# Patient Record
Sex: FEMALE | Race: WHITE | NOT HISPANIC OR LATINO | Employment: OTHER | URBAN - METROPOLITAN AREA
[De-identification: names, ages, dates, MRNs, and addresses within clinical notes are randomized per-mention and may not be internally consistent; named-entity substitution may affect disease eponyms.]

---

## 2023-03-06 PROBLEM — M81.0 OSTEOPOROSIS: Status: ACTIVE | Noted: 2023-03-06

## 2023-03-06 PROBLEM — C50.419 CANCER OF UPPER-OUTER QUADRANT OF FEMALE BREAST (MULTI): Status: ACTIVE | Noted: 2023-03-06

## 2023-03-06 PROBLEM — R30.0 BURNING WITH URINATION: Status: ACTIVE | Noted: 2023-03-06

## 2023-03-06 PROBLEM — I10 BENIGN ESSENTIAL HYPERTENSION: Status: ACTIVE | Noted: 2023-03-06

## 2023-03-06 PROBLEM — S93.409A ANKLE SPRAIN: Status: ACTIVE | Noted: 2023-03-06

## 2023-03-06 PROBLEM — E55.9 VITAMIN D DEFICIENCY: Status: ACTIVE | Noted: 2023-03-06

## 2023-03-06 PROBLEM — S82.899A ANKLE FRACTURE: Status: ACTIVE | Noted: 2023-03-06

## 2023-03-06 PROBLEM — E78.00 PURE HYPERCHOLESTEROLEMIA: Status: ACTIVE | Noted: 2023-03-06

## 2023-03-06 PROBLEM — J18.9 PNEUMONIA: Status: ACTIVE | Noted: 2023-03-06

## 2023-03-06 PROBLEM — R92.30 DENSE BREAST TISSUE ON MAMMOGRAM: Status: ACTIVE | Noted: 2023-03-06

## 2023-03-06 PROBLEM — R05.9 COUGH: Status: ACTIVE | Noted: 2023-03-06

## 2023-03-06 PROBLEM — R30.9 URINARY PAIN: Status: ACTIVE | Noted: 2023-03-06

## 2023-03-06 PROBLEM — J84.89 BOOP (BRONCHIOLITIS OBLITERANS WITH ORGANIZING PNEUMONIA) (MULTI): Status: ACTIVE | Noted: 2023-03-06

## 2023-03-06 RX ORDER — TRIAMTERENE AND HYDROCHLOROTHIAZIDE 37.5; 25 MG/1; MG/1
1 CAPSULE ORAL DAILY
COMMUNITY
Start: 2016-12-02 | End: 2023-10-16 | Stop reason: SDUPTHER

## 2023-03-06 RX ORDER — CHOLECALCIFEROL (VITAMIN D3) 125 MCG
50 TABLET ORAL DAILY
COMMUNITY
Start: 2016-07-07

## 2023-03-06 RX ORDER — CALCIUM CARBONATE 200(500)MG
2 TABLET,CHEWABLE ORAL DAILY
COMMUNITY
Start: 2016-07-07

## 2023-03-06 RX ORDER — MULTIVIT-MIN/IRON/FOLIC ACID/K 18-600-40
CAPSULE ORAL
COMMUNITY
Start: 2020-01-14

## 2023-03-06 RX ORDER — ATORVASTATIN CALCIUM 20 MG/1
1 TABLET, FILM COATED ORAL DAILY
COMMUNITY
Start: 2015-11-19 | End: 2023-10-16 | Stop reason: SDUPTHER

## 2023-03-06 RX ORDER — BIOTIN 1 MG
TABLET ORAL
COMMUNITY
Start: 2021-01-19

## 2023-03-06 RX ORDER — LEVOTHYROXINE SODIUM 75 UG/1
1 TABLET ORAL DAILY
COMMUNITY
Start: 2020-11-11 | End: 2023-10-16 | Stop reason: SDUPTHER

## 2023-03-06 RX ORDER — LATANOPROST 50 UG/ML
SOLUTION/ DROPS OPHTHALMIC
COMMUNITY
Start: 2021-01-19

## 2023-03-10 RX ORDER — ATORVASTATIN CALCIUM 10 MG/1
TABLET, FILM COATED ORAL
COMMUNITY
End: 2023-10-23 | Stop reason: SDUPTHER

## 2023-03-10 RX ORDER — PREDNISONE 10 MG/1
TABLET ORAL
COMMUNITY
Start: 2019-08-13 | End: 2024-03-26 | Stop reason: WASHOUT

## 2023-03-13 LAB
ALANINE AMINOTRANSFERASE (SGPT) (U/L) IN SER/PLAS: 10 U/L (ref 7–45)
ANION GAP IN SER/PLAS: 17 MMOL/L (ref 10–20)
APPEARANCE, URINE: CLEAR
BASOPHILS (10*3/UL) IN BLOOD BY AUTOMATED COUNT: 0.03 X10E9/L (ref 0–0.1)
BASOPHILS/100 LEUKOCYTES IN BLOOD BY AUTOMATED COUNT: 0.4 % (ref 0–2)
BILIRUBIN, URINE: NEGATIVE
BLOOD, URINE: NEGATIVE
CALCIDIOL (25 OH VITAMIN D3) (NG/ML) IN SER/PLAS: 29 NG/ML
CALCIUM (MG/DL) IN SER/PLAS: 9.8 MG/DL (ref 8.6–10.6)
CARBON DIOXIDE, TOTAL (MMOL/L) IN SER/PLAS: 27 MMOL/L (ref 21–32)
CHLORIDE (MMOL/L) IN SER/PLAS: 101 MMOL/L (ref 98–107)
CHOLESTEROL (MG/DL) IN SER/PLAS: 192 MG/DL (ref 0–199)
CHOLESTEROL IN HDL (MG/DL) IN SER/PLAS: 75.2 MG/DL
CHOLESTEROL/HDL RATIO: 2.6
COBALAMIN (VITAMIN B12) (PG/ML) IN SER/PLAS: 595 PG/ML (ref 211–911)
COLOR, URINE: YELLOW
CREATININE (MG/DL) IN SER/PLAS: 0.9 MG/DL (ref 0.5–1.05)
EOSINOPHILS (10*3/UL) IN BLOOD BY AUTOMATED COUNT: 0.15 X10E9/L (ref 0–0.4)
EOSINOPHILS/100 LEUKOCYTES IN BLOOD BY AUTOMATED COUNT: 1.9 % (ref 0–6)
ERYTHROCYTE DISTRIBUTION WIDTH (RATIO) BY AUTOMATED COUNT: 13.4 % (ref 11.5–14.5)
ERYTHROCYTE MEAN CORPUSCULAR HEMOGLOBIN CONCENTRATION (G/DL) BY AUTOMATED: 31.7 G/DL (ref 32–36)
ERYTHROCYTE MEAN CORPUSCULAR VOLUME (FL) BY AUTOMATED COUNT: 85 FL (ref 80–100)
ERYTHROCYTES (10*6/UL) IN BLOOD BY AUTOMATED COUNT: 5.09 X10E12/L (ref 4–5.2)
GFR FEMALE: 66 ML/MIN/1.73M2
GLUCOSE (MG/DL) IN SER/PLAS: 114 MG/DL (ref 74–99)
GLUCOSE, URINE: NEGATIVE MG/DL
HEMATOCRIT (%) IN BLOOD BY AUTOMATED COUNT: 43.5 % (ref 36–46)
HEMOGLOBIN (G/DL) IN BLOOD: 13.8 G/DL (ref 12–16)
IMMATURE GRANULOCYTES/100 LEUKOCYTES IN BLOOD BY AUTOMATED COUNT: 0.3 % (ref 0–0.9)
KETONES, URINE: NEGATIVE MG/DL
LDL: 98 MG/DL (ref 0–99)
LEUKOCYTE ESTERASE, URINE: NEGATIVE
LEUKOCYTES (10*3/UL) IN BLOOD BY AUTOMATED COUNT: 7.8 X10E9/L (ref 4.4–11.3)
LYMPHOCYTES (10*3/UL) IN BLOOD BY AUTOMATED COUNT: 3.29 X10E9/L (ref 0.8–3)
LYMPHOCYTES/100 LEUKOCYTES IN BLOOD BY AUTOMATED COUNT: 42.1 % (ref 13–44)
MONOCYTES (10*3/UL) IN BLOOD BY AUTOMATED COUNT: 0.62 X10E9/L (ref 0.05–0.8)
MONOCYTES/100 LEUKOCYTES IN BLOOD BY AUTOMATED COUNT: 7.9 % (ref 2–10)
NEUTROPHILS (10*3/UL) IN BLOOD BY AUTOMATED COUNT: 3.7 X10E9/L (ref 1.6–5.5)
NEUTROPHILS/100 LEUKOCYTES IN BLOOD BY AUTOMATED COUNT: 47.4 % (ref 40–80)
NITRITE, URINE: NEGATIVE
NRBC (PER 100 WBCS) BY AUTOMATED COUNT: 0 /100 WBC (ref 0–0)
PH, URINE: 7 (ref 5–8)
PLATELETS (10*3/UL) IN BLOOD AUTOMATED COUNT: 253 X10E9/L (ref 150–450)
POTASSIUM (MMOL/L) IN SER/PLAS: 3.7 MMOL/L (ref 3.5–5.3)
PROTEIN, URINE: NEGATIVE MG/DL
SODIUM (MMOL/L) IN SER/PLAS: 141 MMOL/L (ref 136–145)
SPECIFIC GRAVITY, URINE: 1.01 (ref 1–1.03)
THYROTROPIN (MIU/L) IN SER/PLAS BY DETECTION LIMIT <= 0.05 MIU/L: 2.47 MIU/L (ref 0.44–3.98)
TRIGLYCERIDE (MG/DL) IN SER/PLAS: 96 MG/DL (ref 0–149)
UREA NITROGEN (MG/DL) IN SER/PLAS: 19 MG/DL (ref 6–23)
UROBILINOGEN, URINE: <2 MG/DL (ref 0–1.9)
VLDL: 19 MG/DL (ref 0–40)

## 2023-03-16 ENCOUNTER — OFFICE VISIT (OUTPATIENT)
Dept: PRIMARY CARE | Facility: CLINIC | Age: 78
End: 2023-03-16
Payer: MEDICARE

## 2023-03-16 VITALS
BODY MASS INDEX: 24.17 KG/M2 | WEIGHT: 152 LBS | SYSTOLIC BLOOD PRESSURE: 130 MMHG | HEART RATE: 77 BPM | OXYGEN SATURATION: 98 % | DIASTOLIC BLOOD PRESSURE: 70 MMHG | RESPIRATION RATE: 18 BRPM | TEMPERATURE: 97.3 F

## 2023-03-16 DIAGNOSIS — Z00.00 WELL ADULT EXAM: Primary | ICD-10-CM

## 2023-03-16 DIAGNOSIS — R21 RASH: Primary | ICD-10-CM

## 2023-03-16 PROCEDURE — 3075F SYST BP GE 130 - 139MM HG: CPT | Performed by: INTERNAL MEDICINE

## 2023-03-16 PROCEDURE — 1170F FXNL STATUS ASSESSED: CPT | Performed by: INTERNAL MEDICINE

## 2023-03-16 PROCEDURE — 3078F DIAST BP <80 MM HG: CPT | Performed by: INTERNAL MEDICINE

## 2023-03-16 PROCEDURE — G0439 PPPS, SUBSEQ VISIT: HCPCS | Performed by: INTERNAL MEDICINE

## 2023-03-16 PROCEDURE — 1159F MED LIST DOCD IN RCRD: CPT | Performed by: INTERNAL MEDICINE

## 2023-03-16 RX ORDER — FLUTICASONE PROPIONATE 0.5 MG/G
CREAM TOPICAL 2 TIMES DAILY
Qty: 30 G | Refills: 1 | Status: SHIPPED | OUTPATIENT
Start: 2023-03-16

## 2023-03-16 ASSESSMENT — ACTIVITIES OF DAILY LIVING (ADL)
TOILETING: INDEPENDENT
WALKS IN HOME: INDEPENDENT
BATHING: INDEPENDENT
JUDGMENT_ADEQUATE_SAFELY_COMPLETE_DAILY_ACTIVITIES: YES
GROOMING: INDEPENDENT
PATIENT'S MEMORY ADEQUATE TO SAFELY COMPLETE DAILY ACTIVITIES?: YES
DRESSING YOURSELF: INDEPENDENT
HEARING - RIGHT EAR: FUNCTIONAL
FEEDING YOURSELF: INDEPENDENT
ADEQUATE_TO_COMPLETE_ADL: YES
HEARING - LEFT EAR: FUNCTIONAL

## 2023-03-16 ASSESSMENT — ENCOUNTER SYMPTOMS
LOSS OF SENSATION IN FEET: 0
OCCASIONAL FEELINGS OF UNSTEADINESS: 0
DEPRESSION: 0

## 2023-03-16 NOTE — PROGRESS NOTES
Subjective   Reason for Visit: Bell Ruano is an 77 y.o. female here for a Medicare Wellness visit.          Reviewed all medications by prescribing practitioner or clinical pharmacist (such as prescriptions, OTCs, herbal therapies and supplements) and documented in the medical record.    Patient comes in for a physical examination, doing well over - all with no particular complaints.   Also in for laboratory review and health maintenance update.  Updating family history as well.         Patient Care Team:  Santiago Hutchinson MD as PCP - General  Santiago Hutchinson MD as PCP - [facility] Attributed Provider     Review of Systems    Objective   Vitals:  /70 (BP Location: Left arm, Patient Position: Sitting)   Pulse 77   Temp 36.3 °C (97.3 °F)   Resp 18   Wt 68.9 kg (152 lb)   SpO2 98%   BMI 24.17 kg/m²       Physical Exam  Constitutional:       General: She is not in acute distress.     Appearance: Normal appearance. She is normal weight. She is not ill-appearing, toxic-appearing or diaphoretic.   HENT:      Head: Normocephalic.      Right Ear: Tympanic membrane, ear canal and external ear normal.      Left Ear: Tympanic membrane, ear canal and external ear normal.      Nose: Nose normal.      Mouth/Throat:      Mouth: Mucous membranes are moist.      Pharynx: Oropharynx is clear.   Eyes:      Extraocular Movements: Extraocular movements intact.      Conjunctiva/sclera: Conjunctivae normal.      Pupils: Pupils are equal, round, and reactive to light.   Cardiovascular:      Rate and Rhythm: Normal rate.      Heart sounds: No murmur heard.     No friction rub. No gallop.   Pulmonary:      Effort: Pulmonary effort is normal. No respiratory distress.      Breath sounds: Normal breath sounds. No wheezing or rales.   Abdominal:      General: Abdomen is flat. Bowel sounds are normal. There is no distension.      Palpations: Abdomen is soft. There is no mass.      Tenderness: There is no abdominal tenderness.  There is no guarding or rebound.      Hernia: No hernia is present.   Musculoskeletal:         General: No swelling, tenderness, deformity or signs of injury.      Cervical back: Normal range of motion.      Right lower leg: No edema.      Left lower leg: No edema.   Skin:     General: Skin is warm and dry.   Neurological:      General: No focal deficit present.      Mental Status: She is alert. Mental status is at baseline. She is disoriented.   Psychiatric:         Mood and Affect: Mood normal.         Behavior: Behavior normal.         Assessment/Plan   Problem List Items Addressed This Visit          Other    Well adult exam - Primary   ASSESSMENT AND PLAN: Patient on examination is in good health, will do screening blood tests to screen for high cholesterol, diabetes, thyroid. Patient should be taking enough calcium in a balanced diet or supplements to total 1200 mg a day in divided doses unless with history of specific types of kidney stones. Vitamin D 800-1000 iu a day, check levels if not taking any supplements. For Male Patients Only: Prostate cancer screening PSA. Preventive Medicine: colon cancer screening by age 50 if no family history, balanced diet, and exercise as discussed. Seat belt use for injury prevention, living will. Substance use and /or tobacco use counseled when applicable. Alcohol use discussed, use designated . Immunizations TD age 50 and every 10 years. Pneumovax and shingles vaccine counseled. Yearly flu vaccine unless contraindicated. More than 50% of office visit time spent counseling the patient, questions were answered. If problems arise, patient is to call or come back in. It is understood that the responsibility of healthcare is shared by the patient by following a healthy lifestyle and following the plan above as discussed. Complete physical examination in a year.Patient  knows to call for lab results in two weeks if he does not receive letter or call from our office.

## 2023-10-16 DIAGNOSIS — E03.9 ACQUIRED HYPOTHYROIDISM: ICD-10-CM

## 2023-10-16 DIAGNOSIS — I10 HYPERTENSION, UNSPECIFIED TYPE: Primary | ICD-10-CM

## 2023-10-16 DIAGNOSIS — E78.2 MIXED HYPERLIPIDEMIA: ICD-10-CM

## 2023-10-16 RX ORDER — TRIAMTERENE AND HYDROCHLOROTHIAZIDE 37.5; 25 MG/1; MG/1
1 CAPSULE ORAL DAILY
Qty: 90 CAPSULE | Refills: 1 | Status: SHIPPED | OUTPATIENT
Start: 2023-10-16 | End: 2023-10-23 | Stop reason: SDUPTHER

## 2023-10-16 RX ORDER — ATORVASTATIN CALCIUM 20 MG/1
20 TABLET, FILM COATED ORAL DAILY
Qty: 90 TABLET | Refills: 1 | Status: SHIPPED | OUTPATIENT
Start: 2023-10-16

## 2023-10-16 RX ORDER — LEVOTHYROXINE SODIUM 75 UG/1
75 TABLET ORAL DAILY
Qty: 90 TABLET | Refills: 1 | Status: SHIPPED | OUTPATIENT
Start: 2023-10-16 | End: 2023-10-23 | Stop reason: SDUPTHER

## 2023-10-19 NOTE — TELEPHONE ENCOUNTER
Rxs sent to [facility].  Pt requested send to [facility] in [place] [phone].  Thanks

## 2023-10-23 DIAGNOSIS — I10 HYPERTENSION, UNSPECIFIED TYPE: ICD-10-CM

## 2023-10-23 DIAGNOSIS — E03.9 ACQUIRED HYPOTHYROIDISM: ICD-10-CM

## 2023-10-23 DIAGNOSIS — E78.2 MIXED HYPERLIPIDEMIA: Primary | ICD-10-CM

## 2023-10-23 NOTE — TELEPHONE ENCOUNTER
Patients previous Rx's were sent to wrong pharmacy , prefers [facility] and patient out of medications as of today

## 2023-10-24 RX ORDER — LEVOTHYROXINE SODIUM 75 UG/1
75 TABLET ORAL DAILY
Qty: 90 TABLET | Refills: 1 | Status: SHIPPED | OUTPATIENT
Start: 2023-10-24 | End: 2024-04-22

## 2023-10-24 RX ORDER — TRIAMTERENE AND HYDROCHLOROTHIAZIDE 37.5; 25 MG/1; MG/1
1 CAPSULE ORAL DAILY
Qty: 90 CAPSULE | Refills: 1 | Status: SHIPPED | OUTPATIENT
Start: 2023-10-24 | End: 2024-05-07 | Stop reason: SDUPTHER

## 2023-10-24 RX ORDER — ATORVASTATIN CALCIUM 10 MG/1
10 TABLET, FILM COATED ORAL DAILY
Qty: 90 TABLET | Refills: 1 | Status: SHIPPED | OUTPATIENT
Start: 2023-10-24

## 2024-01-30 ENCOUNTER — HOSPITAL ENCOUNTER (OUTPATIENT)
Dept: RADIOLOGY | Facility: CLINIC | Age: 79
Discharge: HOME | End: 2024-01-30
Payer: MEDICARE

## 2024-01-30 VITALS — BODY MASS INDEX: 24.41 KG/M2 | WEIGHT: 151.9 LBS | HEIGHT: 66 IN

## 2024-01-30 DIAGNOSIS — Z12.39 ENCOUNTER FOR OTHER SCREENING FOR MALIGNANT NEOPLASM OF BREAST: ICD-10-CM

## 2024-01-30 PROCEDURE — 77067 SCR MAMMO BI INCL CAD: CPT | Mod: BILATERAL PROCEDURE | Performed by: RADIOLOGY

## 2024-01-30 PROCEDURE — 77067 SCR MAMMO BI INCL CAD: CPT

## 2024-01-30 PROCEDURE — 77063 BREAST TOMOSYNTHESIS BI: CPT | Mod: BILATERAL PROCEDURE | Performed by: RADIOLOGY

## 2024-02-15 ENCOUNTER — APPOINTMENT (OUTPATIENT)
Dept: RADIOLOGY | Facility: CLINIC | Age: 79
End: 2024-02-15
Payer: MEDICARE

## 2024-02-15 DIAGNOSIS — Z12.31 SCREENING MAMMOGRAM FOR BREAST CANCER: ICD-10-CM

## 2024-02-21 ENCOUNTER — HOSPITAL ENCOUNTER (OUTPATIENT)
Dept: RADIOLOGY | Facility: EXTERNAL LOCATION | Age: 79
Discharge: HOME | End: 2024-02-21
Payer: MEDICARE

## 2024-02-21 DIAGNOSIS — M79.641 RIGHT HAND PAIN: ICD-10-CM

## 2024-03-07 ENCOUNTER — TELEPHONE (OUTPATIENT)
Dept: PRIMARY CARE | Facility: CLINIC | Age: 79
End: 2024-03-07
Payer: MEDICARE

## 2024-03-07 DIAGNOSIS — R79.89 ABNORMAL CBC: ICD-10-CM

## 2024-03-07 DIAGNOSIS — Z00.00 ENCOUNTER FOR ANNUAL WELLNESS EXAM IN MEDICARE PATIENT: ICD-10-CM

## 2024-03-07 DIAGNOSIS — E55.9 VITAMIN D DEFICIENCY: ICD-10-CM

## 2024-03-08 NOTE — TELEPHONE ENCOUNTER
Spoke with [facility] on a good wrist/hand ortho provider and he recc. Dr. Darvin Cuba - he works at [facility] ,office number is [phone] . LVM for patient explaining, and to call back with any further questions

## 2024-03-12 ENCOUNTER — OFFICE VISIT (OUTPATIENT)
Dept: ORTHOPEDIC SURGERY | Facility: CLINIC | Age: 79
End: 2024-03-12
Payer: MEDICARE

## 2024-03-12 DIAGNOSIS — S69.91XA RIGHT WRIST INJURY, INITIAL ENCOUNTER: ICD-10-CM

## 2024-03-12 PROCEDURE — 1160F RVW MEDS BY RX/DR IN RCRD: CPT | Performed by: ORTHOPAEDIC SURGERY

## 2024-03-12 PROCEDURE — 1159F MED LIST DOCD IN RCRD: CPT | Performed by: ORTHOPAEDIC SURGERY

## 2024-03-12 PROCEDURE — 1125F AMNT PAIN NOTED PAIN PRSNT: CPT | Performed by: ORTHOPAEDIC SURGERY

## 2024-03-12 PROCEDURE — 99204 OFFICE O/P NEW MOD 45 MIN: CPT | Performed by: ORTHOPAEDIC SURGERY

## 2024-03-12 PROCEDURE — L3908 WHO COCK-UP NONMOLDE PRE OTS: HCPCS | Performed by: ORTHOPAEDIC SURGERY

## 2024-03-12 PROCEDURE — 1036F TOBACCO NON-USER: CPT | Performed by: ORTHOPAEDIC SURGERY

## 2024-03-12 ASSESSMENT — PAIN - FUNCTIONAL ASSESSMENT: PAIN_FUNCTIONAL_ASSESSMENT: 0-10

## 2024-03-12 ASSESSMENT — PAIN SCALES - GENERAL: PAINLEVEL_OUTOF10: 3

## 2024-03-12 ASSESSMENT — PAIN DESCRIPTION - DESCRIPTORS: DESCRIPTORS: ACHING;SHARP

## 2024-03-12 NOTE — PROGRESS NOTES
History of Present Illness:  Chief Complaint   Patient presents with    Right Wrist - Injury       78-year-old female presents for evaluation of right wrist injury that occurred February 20 when she slipped and fell on outstretched right hand/wrist.  She had immediate pain as well as swelling and bruising at that time.  She was initially seen at urgent care and told that she had a wrist sprain.  She has been using Ace wrap for some support, but otherwise trying to use her right hand/wrist.  She has had continued pain as well as swelling.  Bruising has improved.  No numbness or tingling.    Past Medical History:   Diagnosis Date    Arthritis 2020    Cancer (CMS/Former[facility]) 2016    Fracture of ankle 27 years ago    Other abnormal and inconclusive findings on diagnostic imaging of breast 01/11/2016    Mammogram abnormal    Other conditions influencing health status 03/15/2016    Seroma, postoperative    Other specified postprocedural states 03/07/2016    Post-operative state    Personal history of irradiation     Personal history of other specified conditions 11/18/2015    History of lump of right breast    Wrist sprain 2-21-24       Medication Documentation Review Audit       Reviewed by Griselda Rubi CMA (Medical Assistant) on 03/12/24 at 0924      Medication Order Taking? Sig Documenting Provider Last Dose Status   ascorbic acid, vitamin C, 500 mg capsule [id number]  Take by mouth. Historical Provider, MD  Active   atorvastatin (Lipitor) 10 mg tablet [id number]  Take 1 tablet (10 mg) by mouth once daily. Santiago Hutchinson MD  Active   atorvastatin (Lipitor) 20 mg tablet [id number]  Take 1 tablet (20 mg) by mouth once daily. Santiago Hutchinson MD  Active   biotin 1 mg tablet [id number]  Take by mouth. Historical Provider, MD  Active   calcium carbonate (Tums) 200 mg calcium chewable tablet [id number]  Chew 2 tablets (1,000 mg) once daily. Patients choice. Historical Provider, MD  Active   ergocalciferol, vitamin D2, 50 mcg (2,000  unit) tablet [id number]  Take 50 mcg by mouth once daily. Historical Provider, MD  Active   fluticasone (Cutivate) 0.05 % cream [id number]  Apply topically 2 times a day. Santiago Hutchinson MD  Active   latanoprost (Xalatan) 0.005 % ophthalmic solution [id number]  Administer into affected eye(s). Historical Provider, MD  Active   levothyroxine (Synthroid, Levoxyl) 75 mcg tablet [id number]  Take 1 tablet (75 mcg) by mouth once daily. Santiago Hutchinson MD  Active   predniSONE (Deltasone) 10 mg tablet [id number]  Take by mouth. Historical Provider, MD  Active   triamterene-hydrochlorothiazid (Dyazide) 37.5-25 mg capsule [id number]  Take 1 capsule by mouth once daily. Santiago Hutchinson MD  Active                    No Known Allergies    Social History     Socioeconomic History    Marital status:      Spouse name: Not on file    Number of children: Not on file    Years of education: Not on file    Highest education level: Not on file   Occupational History    Not on file   Tobacco Use    Smoking status: Former     Packs/day: 1.00     Years: 15.00     Additional pack years: 0.00     Total pack years: 15.00     Types: Cigarettes     Start date: 1970     Quit date: 1990     Years since quittin.2    Smokeless tobacco: Never   Substance and Sexual Activity    Alcohol use: Not Currently    Drug use: Never    Sexual activity: Not Currently     Partners: Male     Birth control/protection: None   Other Topics Concern    Not on file   Social History Narrative    Not on file     Social Determinants of Health     Financial Resource Strain: Not on file   Food Insecurity: Not on file   Transportation Needs: Not on file   Physical Activity: Not on file   Stress: Not on file   Social Connections: Not on file   Intimate Partner Violence: Not on file   Housing Stability: Not on file       Past Surgical History:   Procedure Laterality Date    ANKLE FRACTURE SURGERY  27 years ago    ANKLE SURGERY  2015    Ankle Surgery    BREAST  LUMPECTOMY  03/03/2016    Right Breast Lumpectomy    OTHER SURGICAL HISTORY  01/02/2018    Biopsy Pleural    TUBAL LIGATION  11/19/2015    Tubal Ligation        Review of Systems   GENERAL: Negative for malaise, significant weight loss, fever  MUSCULOSKELETAL: see HPI  NEURO:  Negative     Physical Examination  Constitutional: Appears well-developed and well-nourished.  Head: Normocephalic and atraumatic.  Eyes: EOMI grossly  Cardiovascular: Intact distal pulses.   Respiratory: Effort normal. No respiratory distress.  Neurologic: Alert and oriented to person, place, and time.  Skin: Skin is warm and dry.  Hematologic / Lymphatic: No lymphedema, lymphangitis.  Psychiatric: normal mood and affect. Behavior is normal.   Musculoskeletal:  Right wrist: Mild edema and resolving ecchymosis along FCR sheath.  Tenderness overlying distal radius metaphyseal region.  0 cm DPC.  EPL/FPL and intrinsic function intact.  Capillary refill less than 2 seconds.  No tenderness overlying anatomic snuffbox/distal pole of scaphoid.    Radiographs: Right wrist radiographs from February 21, 2024 available for my review/interpretation.  There is cortical disruption dorsally and then distal radius metaphyseal region.  Alignment intact.  Significant degenerative changes noted about thumb CMC/STT joints as well as IP joints.     Assessment:  Patient with nondisplaced right distal radius fracture     Plan:  Nature of the diagnosis was discussed with the patient.  Given the well-maintained alignment we discussed recommendation for continued nonoperative treatment.  Patient fitted and transitioned into wrist brace that she will wear for support.  Recommend remaining less than 1 to 2 pounds weightbearing to right hand/wrist.  She will follow-up in 4 weeks with new right wrist radiographs.  Potential for displacement reviewed with patient and she understands importance of compliance with minimal weightbearing/activities.  Okay to remove brace for  active range of motion exercises.

## 2024-03-18 ENCOUNTER — LAB (OUTPATIENT)
Dept: LAB | Facility: LAB | Age: 79
End: 2024-03-18
Payer: MEDICARE

## 2024-03-18 DIAGNOSIS — E55.9 VITAMIN D DEFICIENCY: ICD-10-CM

## 2024-03-18 DIAGNOSIS — Z00.00 ENCOUNTER FOR ANNUAL WELLNESS EXAM IN MEDICARE PATIENT: ICD-10-CM

## 2024-03-18 DIAGNOSIS — R79.89 ABNORMAL CBC: ICD-10-CM

## 2024-03-18 LAB
25(OH)D3 SERPL-MCNC: 24 NG/ML (ref 30–100)
ALT SERPL W P-5'-P-CCNC: 7 U/L (ref 7–45)
ANION GAP SERPL CALC-SCNC: 14 MMOL/L (ref 10–20)
APPEARANCE UR: CLEAR
BILIRUB UR STRIP.AUTO-MCNC: NEGATIVE MG/DL
BUN SERPL-MCNC: 13 MG/DL (ref 6–23)
CALCIUM SERPL-MCNC: 9.9 MG/DL (ref 8.6–10.6)
CHLORIDE SERPL-SCNC: 101 MMOL/L (ref 98–107)
CHOLEST SERPL-MCNC: 190 MG/DL (ref 0–199)
CHOLESTEROL/HDL RATIO: 3.5
CO2 SERPL-SCNC: 29 MMOL/L (ref 21–32)
COLOR UR: NORMAL
CREAT SERPL-MCNC: 0.85 MG/DL (ref 0.5–1.05)
EGFRCR SERPLBLD CKD-EPI 2021: 71 ML/MIN/1.73M*2
ERYTHROCYTE [DISTWIDTH] IN BLOOD BY AUTOMATED COUNT: 12.7 % (ref 11.5–14.5)
EST. AVERAGE GLUCOSE BLD GHB EST-MCNC: 123 MG/DL
GLUCOSE SERPL-MCNC: 102 MG/DL (ref 74–99)
GLUCOSE UR STRIP.AUTO-MCNC: NORMAL MG/DL
HBA1C MFR BLD: 5.9 %
HCT VFR BLD AUTO: 44.4 % (ref 36–46)
HDLC SERPL-MCNC: 53.6 MG/DL
HGB BLD-MCNC: 14 G/DL (ref 12–16)
KETONES UR STRIP.AUTO-MCNC: NEGATIVE MG/DL
LDLC SERPL CALC-MCNC: 105 MG/DL
LEUKOCYTE ESTERASE UR QL STRIP.AUTO: NEGATIVE
MCH RBC QN AUTO: 26.6 PG (ref 26–34)
MCHC RBC AUTO-ENTMCNC: 31.5 G/DL (ref 32–36)
MCV RBC AUTO: 84 FL (ref 80–100)
NITRITE UR QL STRIP.AUTO: NEGATIVE
NON HDL CHOLESTEROL: 136 MG/DL (ref 0–149)
NRBC BLD-RTO: 0 /100 WBCS (ref 0–0)
PH UR STRIP.AUTO: 6.5 [PH]
PLATELET # BLD AUTO: 271 X10*3/UL (ref 150–450)
POTASSIUM SERPL-SCNC: 3.8 MMOL/L (ref 3.5–5.3)
PROT UR STRIP.AUTO-MCNC: NEGATIVE MG/DL
RBC # BLD AUTO: 5.26 X10*6/UL (ref 4–5.2)
RBC # UR STRIP.AUTO: NEGATIVE /UL
SODIUM SERPL-SCNC: 140 MMOL/L (ref 136–145)
SP GR UR STRIP.AUTO: 1.01
TRIGL SERPL-MCNC: 159 MG/DL (ref 0–149)
TSH SERPL-ACNC: 1.63 MIU/L (ref 0.44–3.98)
UROBILINOGEN UR STRIP.AUTO-MCNC: NORMAL MG/DL
VIT B12 SERPL-MCNC: 463 PG/ML (ref 211–911)
VLDL: 32 MG/DL (ref 0–40)
WBC # BLD AUTO: 6.8 X10*3/UL (ref 4.4–11.3)

## 2024-03-18 PROCEDURE — 80048 BASIC METABOLIC PNL TOTAL CA: CPT

## 2024-03-18 PROCEDURE — 81003 URINALYSIS AUTO W/O SCOPE: CPT

## 2024-03-18 PROCEDURE — 84443 ASSAY THYROID STIM HORMONE: CPT

## 2024-03-18 PROCEDURE — 82306 VITAMIN D 25 HYDROXY: CPT

## 2024-03-18 PROCEDURE — 83036 HEMOGLOBIN GLYCOSYLATED A1C: CPT

## 2024-03-18 PROCEDURE — 84460 ALANINE AMINO (ALT) (SGPT): CPT

## 2024-03-18 PROCEDURE — 36415 COLL VENOUS BLD VENIPUNCTURE: CPT

## 2024-03-18 PROCEDURE — 80061 LIPID PANEL: CPT

## 2024-03-18 PROCEDURE — 82607 VITAMIN B-12: CPT

## 2024-03-18 PROCEDURE — 85027 COMPLETE CBC AUTOMATED: CPT

## 2024-03-27 ENCOUNTER — OFFICE VISIT (OUTPATIENT)
Dept: PRIMARY CARE | Facility: CLINIC | Age: 79
End: 2024-03-27
Payer: MEDICARE

## 2024-03-27 VITALS
TEMPERATURE: 97.3 F | WEIGHT: 152.4 LBS | SYSTOLIC BLOOD PRESSURE: 126 MMHG | BODY MASS INDEX: 23.92 KG/M2 | RESPIRATION RATE: 16 BRPM | HEIGHT: 67 IN | OXYGEN SATURATION: 97 % | DIASTOLIC BLOOD PRESSURE: 66 MMHG | HEART RATE: 63 BPM

## 2024-03-27 DIAGNOSIS — Z00.00 WELL ADULT EXAM: ICD-10-CM

## 2024-03-27 DIAGNOSIS — Z12.11 COLON CANCER SCREENING: Primary | ICD-10-CM

## 2024-03-27 PROCEDURE — 3078F DIAST BP <80 MM HG: CPT | Performed by: INTERNAL MEDICINE

## 2024-03-27 PROCEDURE — G0439 PPPS, SUBSEQ VISIT: HCPCS | Performed by: INTERNAL MEDICINE

## 2024-03-27 PROCEDURE — 1159F MED LIST DOCD IN RCRD: CPT | Performed by: INTERNAL MEDICINE

## 2024-03-27 PROCEDURE — 1170F FXNL STATUS ASSESSED: CPT | Performed by: INTERNAL MEDICINE

## 2024-03-27 PROCEDURE — 1036F TOBACCO NON-USER: CPT | Performed by: INTERNAL MEDICINE

## 2024-03-27 PROCEDURE — 3074F SYST BP LT 130 MM HG: CPT | Performed by: INTERNAL MEDICINE

## 2024-03-27 PROCEDURE — 1160F RVW MEDS BY RX/DR IN RCRD: CPT | Performed by: INTERNAL MEDICINE

## 2024-03-27 PROCEDURE — 1126F AMNT PAIN NOTED NONE PRSNT: CPT | Performed by: INTERNAL MEDICINE

## 2024-03-27 ASSESSMENT — PATIENT HEALTH QUESTIONNAIRE - PHQ9
8. MOVING OR SPEAKING SO SLOWLY THAT OTHER PEOPLE COULD HAVE NOTICED. OR THE OPPOSITE, BEING SO FIGETY OR RESTLESS THAT YOU HAVE BEEN MOVING AROUND A LOT MORE THAN USUAL: NOT AT ALL
9. THOUGHTS THAT YOU WOULD BE BETTER OFF DEAD, OR OF HURTING YOURSELF: NOT AT ALL
3. TROUBLE FALLING OR STAYING ASLEEP OR SLEEPING TOO MUCH: NOT AT ALL
6. FEELING BAD ABOUT YOURSELF - OR THAT YOU ARE A FAILURE OR HAVE LET YOURSELF OR YOUR FAMILY DOWN: NOT AT ALL
7. TROUBLE CONCENTRATING ON THINGS, SUCH AS READING THE NEWSPAPER OR WATCHING TELEVISION: NOT AT ALL
1. LITTLE INTEREST OR PLEASURE IN DOING THINGS: NOT AT ALL
10. IF YOU CHECKED OFF ANY PROBLEMS, HOW DIFFICULT HAVE THESE PROBLEMS MADE IT FOR YOU TO DO YOUR WORK, TAKE CARE OF THINGS AT HOME, OR GET ALONG WITH OTHER PEOPLE: NOT DIFFICULT AT ALL
SUM OF ALL RESPONSES TO PHQ9 QUESTIONS 1 AND 2: 0
5. POOR APPETITE OR OVEREATING: NOT AT ALL
SUM OF ALL RESPONSES TO PHQ QUESTIONS 1-9: 0
2. FEELING DOWN, DEPRESSED OR HOPELESS: NOT AT ALL
4. FEELING TIRED OR HAVING LITTLE ENERGY: NOT AT ALL

## 2024-03-27 ASSESSMENT — ACTIVITIES OF DAILY LIVING (ADL)
DRESSING: INDEPENDENT
TAKING_MEDICATION: INDEPENDENT
DOING_HOUSEWORK: INDEPENDENT
MANAGING_FINANCES: INDEPENDENT
GROCERY_SHOPPING: INDEPENDENT
BATHING: INDEPENDENT

## 2024-03-27 ASSESSMENT — ENCOUNTER SYMPTOMS
DEPRESSION: 0
OCCASIONAL FEELINGS OF UNSTEADINESS: 0
LOSS OF SENSATION IN FEET: 0

## 2024-03-27 ASSESSMENT — PAIN SCALES - GENERAL: PAINLEVEL: 0-NO PAIN

## 2024-03-27 NOTE — PROGRESS NOTES
"Subjective   Patient ID: Bell Ruano \"Estefany\" is a 78 y.o. female who presents for Medicare Annual Wellness Visit Subsequent.  HPI    Review of Systems    Objective   Physical Exam  /66   Pulse 63   Temp 36.3 °C (97.3 °F)   Resp 16   Ht 1.702 m (5' 7\")   Wt 69.1 kg (152 lb 6.4 oz)   SpO2 97%   BMI 23.87 kg/m²         Assessment/Plan   Problem List Items Addressed This Visit       Colon cancer screening - Primary    Relevant Orders    Cologuard® colon cancer screening     Other Visit Diagnoses       Well adult exam                   "

## 2024-04-09 ENCOUNTER — OFFICE VISIT (OUTPATIENT)
Dept: ORTHOPEDIC SURGERY | Facility: CLINIC | Age: 79
End: 2024-04-09
Payer: MEDICARE

## 2024-04-09 ENCOUNTER — HOSPITAL ENCOUNTER (OUTPATIENT)
Dept: RADIOLOGY | Facility: CLINIC | Age: 79
Discharge: HOME | End: 2024-04-09
Payer: MEDICARE

## 2024-04-09 DIAGNOSIS — S69.91XA RIGHT WRIST INJURY, INITIAL ENCOUNTER: ICD-10-CM

## 2024-04-09 PROCEDURE — 99213 OFFICE O/P EST LOW 20 MIN: CPT | Performed by: ORTHOPAEDIC SURGERY

## 2024-04-09 PROCEDURE — 1160F RVW MEDS BY RX/DR IN RCRD: CPT | Performed by: ORTHOPAEDIC SURGERY

## 2024-04-09 PROCEDURE — 73110 X-RAY EXAM OF WRIST: CPT | Mod: RT

## 2024-04-09 PROCEDURE — 73110 X-RAY EXAM OF WRIST: CPT | Mod: RIGHT SIDE | Performed by: RADIOLOGY

## 2024-04-09 PROCEDURE — 1159F MED LIST DOCD IN RCRD: CPT | Performed by: ORTHOPAEDIC SURGERY

## 2024-04-09 PROCEDURE — 1036F TOBACCO NON-USER: CPT | Performed by: ORTHOPAEDIC SURGERY

## 2024-04-09 ASSESSMENT — PAIN - FUNCTIONAL ASSESSMENT: PAIN_FUNCTIONAL_ASSESSMENT: NO/DENIES PAIN

## 2024-04-09 NOTE — PROGRESS NOTES
History of Present Illness:  Chief Complaint   Patient presents with    Right Wrist - Follow-up     fracture     78-year-old female undergoing nonoperative treatment for right distal radius fracture.  She has continued use of her brace.  She does admit to using her right hand and fingers at times.  Pain does seem to be improving, but still some residual soreness.  No numbness or tingling.    Past Medical History:   Diagnosis Date    Arthritis 2020    Cancer (CMS/Former[facility]) 2016    Fracture of ankle 27 years ago    Other abnormal and inconclusive findings on diagnostic imaging of breast 01/11/2016    Mammogram abnormal    Other conditions influencing health status 03/15/2016    Seroma, postoperative    Other specified postprocedural states 03/07/2016    Post-operative state    Personal history of irradiation     Personal history of other specified conditions 11/18/2015    History of lump of right breast    Wrist sprain 2-21-24       Medication Documentation Review Audit       Reviewed by Griselda Rubi CMA (Medical Assistant) on 04/09/24 at 1005      Medication Order Taking? Sig Documenting Provider Last Dose Status   ascorbic acid, vitamin C, 500 mg capsule [id number]  Take by mouth. Historical MD Sarthak  Active   atorvastatin (Lipitor) 10 mg tablet [id number]  Take 1 tablet (10 mg) by mouth once daily. Santiago Hutchinson MD  Active   atorvastatin (Lipitor) 20 mg tablet [id number]  Take 1 tablet (20 mg) by mouth once daily. Santiago Hutchinson MD  Active   biotin 1 mg tablet [id number]  Take by mouth. Meryl De León MD  Active   calcium carbonate (Tums) 200 mg calcium chewable tablet [id number]  Chew 2 tablets (1,000 mg) once daily. Patients choice. Meryl De León MD  Active   ergocalciferol, vitamin D2, 50 mcg (2,000 unit) tablet [id number]  Take 50 mcg by mouth once daily. Historical MD Sarthak  Active   fluticasone (Cutivate) 0.05 % cream [id number]  Apply topically 2 times a day. Santiago Hutchinson MD  Active    latanoprost (Xalatan) 0.005 % ophthalmic solution [id number]  Administer into affected eye(s). Historical Provider, MD  Active   levothyroxine (Synthroid, Levoxyl) 75 mcg tablet [id number]  Take 1 tablet (75 mcg) by mouth once daily. Santiago Hutchinson MD  Active   triamterene-hydrochlorothiazid (Dyazide) 37.5-25 mg capsule [id number]  Take 1 capsule by mouth once daily. Santiago Hutchinson MD  Active                    No Known Allergies    Social History     Socioeconomic History    Marital status:      Spouse name: Not on file    Number of children: Not on file    Years of education: Not on file    Highest education level: Not on file   Occupational History    Not on file   Tobacco Use    Smoking status: Former     Packs/day: 1.00     Years: 15.00     Additional pack years: 0.00     Total pack years: 15.00     Types: Cigarettes     Start date: 1970     Quit date: 1990     Years since quittin.2    Smokeless tobacco: Never   Substance and Sexual Activity    Alcohol use: Not Currently    Drug use: Never    Sexual activity: Not Currently     Partners: Male     Birth control/protection: None   Other Topics Concern    Not on file   Social History Narrative    Not on file     Social Determinants of Health     Financial Resource Strain: Not on file   Food Insecurity: Not on file   Transportation Needs: Not on file   Physical Activity: Not on file   Stress: Not on file   Social Connections: Not on file   Intimate Partner Violence: Not on file   Housing Stability: Not on file       Past Surgical History:   Procedure Laterality Date    ANKLE FRACTURE SURGERY  27 years ago    ANKLE SURGERY  2015    Ankle Surgery    BREAST LUMPECTOMY  2016    Right Breast Lumpectomy    OTHER SURGICAL HISTORY  2018    Biopsy Pleural    TUBAL LIGATION  2015    Tubal Ligation        Review of Systems   GENERAL: Negative for malaise, significant weight loss, fever  MUSCULOSKELETAL: see HPI  NEURO:  Negative      Physical Examination  Constitutional: Appears well-developed and well-nourished.  Head: Normocephalic and atraumatic.  Eyes: EOMI grossly  Cardiovascular: Intact distal pulses.   Respiratory: Effort normal. No respiratory distress.  Neurologic: Alert and oriented to person, place, and time.  Skin: Skin is warm and dry.  Hematologic / Lymphatic: No lymphedema, lymphangitis.  Psychiatric: normal mood and affect. Behavior is normal.   Musculoskeletal:  Right wrist: Previous edema improving.  Ecchymosis has fully resolved.  No tenderness overlying distal radius metaphyseal region.  0 cm DPC.  EPL/FPL intact.  Mild tenderness in region of TFCC.      Radiographs: Right wrist radiographs ordered and available for my review/interpretation demonstrate interval healing of distal radius fracture with less than 1 mm articular step-off appreciated.  Significant degenerative changes noted.    Assessment:  Patient with nondisplaced right distal radius fracture that is well-healing     Plan:  Patient may gradually transition out of her wrist brace.  Referral to therapy has been made for assistance with mobilization and strengthening.  Tentative plan for follow-up in 6 weeks for clinical check.  Questions addressed.

## 2024-04-15 ENCOUNTER — EVALUATION (OUTPATIENT)
Dept: OCCUPATIONAL THERAPY | Facility: CLINIC | Age: 79
End: 2024-04-15
Payer: MEDICARE

## 2024-04-15 DIAGNOSIS — M25.631 DECREASED RANGE OF MOTION OF RIGHT WRIST: ICD-10-CM

## 2024-04-15 DIAGNOSIS — S69.91XA RIGHT WRIST INJURY, INITIAL ENCOUNTER: ICD-10-CM

## 2024-04-15 DIAGNOSIS — S69.91XD INJURY OF WRIST, RIGHT, SUBSEQUENT ENCOUNTER: Primary | ICD-10-CM

## 2024-04-15 PROCEDURE — 97110 THERAPEUTIC EXERCISES: CPT | Mod: GO

## 2024-04-15 PROCEDURE — 97165 OT EVAL LOW COMPLEX 30 MIN: CPT | Mod: GO

## 2024-04-15 ASSESSMENT — ENCOUNTER SYMPTOMS
OCCASIONAL FEELINGS OF UNSTEADINESS: 0
DEPRESSION: 0
LOSS OF SENSATION IN FEET: 0

## 2024-04-15 NOTE — PROGRESS NOTES
"  Occupational Therapy Orthopedic Evaluation    Patient Name: Bell Ruano \"Chanel"  MRN: [id number]  Today's Date: 4/15/2024       Insurance:  Visit number: 1  Insurance Type: Medicare Part A and B, [facility]  Authorization info: [place]  Cert dates: 4/15/2024- 6/10/2024    General:  Reason for visit: Right wrist injury  Referred by: Dr. DURGA Chavez MD     Current Problem  1. Injury of wrist, right, subsequent encounter        2. Right wrist injury, initial encounter  Referral to Occupational Therapy      3. Decreased range of motion of right wrist          Precautions:  Falls  Medical History Form: Reviewed (scanned into chart)  Diagnoses pertinent to therapy: Right breast CA s/p lumpectomy , radiation approx 8 yrs ago, Arthritis    SUBJECTIVE:   Patient is a 78 y.o. female referred to Occupational Therapy for the diagnosis of Right distal radius fracture. Pt fell outside on snow, went to urgent care and was told it was not fractured. She was unaware that she broke the wrist for about two weeks prior to seeing ortho. Pt is beginning to wean off of prefab splint.     BINTA: Fall  Date of onset: Early February 2024  Date of surgery: NA  Chief complaints/concerns from patient/family member: Wrist fatigue and increased difficulty with household lifting, pots/pans, cooking.     Hand Dominance: Right    Pain:   Location: Right dorsal wrist and thumb base  At rest :  0/10             Fxal use/movement:    3/10     Worst:  3/10  Description/Type: occasionally sharp, ache  Aggravating Factors: f/a rotation, deviation   Relieving Factors: rest    Relevant Information (PMH & Previous Tests/Imaging): Xray   Previous Interventions/Treatments: None    Prior Level of Function (PLOF)  Previous ADL/IADL Status:  Independent   Work/School: Homemaker   Leisure/Hobbies: Gardening, owns cottage [place]    Patients Living Environment: Spouse    Primary Language: English    Pt goals for therapy: Increase use of arm as previous "     OBJECTIVE:     ROM:  Elbow/Forearm AROM (Degrees of motion)    R L   Extension WFL/WNL WFL all    Flexion WNL    Pronation 77 pain    Supination 68 pain       Wrist AROM  (Degrees of motion)    R L   Extension 55 WFL all    Flexion 57    Radial Deviation 8    Ulnar Deviation 20       Composite fist/digit AROM: WFL , tightness reports  Thumb AROM: WFL all planes    Hand Strength Measures: LBS   R L   Dynamometer  25 37   Lateral Pinch 8 9   3jaw Pinch  Tip Pinch 5.5 pain   6.5 6  9      Physical Observation:   Edema: WFL  Paresthesias: Intact, no complaints  Scar/Incision: NA  Coordination: Very slight difficulty reported    Quick Dash outcome measurement:  27.27 %    Red Flags: Do you have any of the following? No  Fever/chills, unexplained weight changes, dizziness/fainting, unexplained change in bowel or bladder functions, unexplained malaise or muscle weakness, night pain/sweats, numbness or tingling    Treatment:     OT evaluation completed and HEP issued.   Patient education on rationale, benefits and timing of MH, warm soaks and CP use to decrease symptoms/pain.  Patient fitted and issued  tubigrip D sleeve for support while weaning splint. Wear, care and precautions instructed to patient. She reports comfortable fit and good understanding of wear.   Self PROM wrist flex/extension ex, AROM for F/A and wrist   Pink foam block issued for , lateral and 3pt pinch.  Patient verbalizes and demonstrates good understanding,technique and precautions with above.  Written and illustrated handouts issued to patient.     ASSESSMENT:   Patient is a 78 y.o. female  with the diagnosis of right wrist injury resulting in limited ability and participation in ADLs, IADLS and leisure activities.. Pt demonstrating increased pain with decreased ROM and strength for daily fxal activities. Pt would benefit from skilled Occupational Therapy to address the above deficits in order to return to functional activities as able  with increased independence.     PLAN:  Goals:  Active       OT Goals       Patient to be independent with HEP to further fxal progress.        Start:  04/15/24    Expected End:  06/14/24            Patient to increase right  AROM by 10 degrees of supination, wrist flexion,extension for increase ease and ability with ADLS.        Start:  04/15/24    Expected End:  06/14/24            Patient to increase  strength to 30#  of hand for ease with lifting and carrying tasks and gardening.        Start:  04/15/24    Expected End:  06/14/24            Patient to increase m. strength to 4+/5 or better for increased ease ability for IADLS and gardening.        Start:  04/15/24    Expected End:  06/14/24               OT Problem       PATIENT WILL REPORT PAIN OF occasional 1/10 DEMONSTRATING A REDUCTION OF OVERALL PAIN       Start:  04/15/24    Expected End:  06/14/24              Planned Interventions include: therapeutic exercise, therapeutic activity, self-care home management, manual therapy, neuromuscular education , electric stimulation, fluidotherapy, ultrasound, Home exercise program, orthosis fabrication, wound care/scar management.     Frequency: 1-2 x week  Duration: 3-4 weeks    Rehab Potential: Good  Plan of care was developed with input and agreement by the patient.     Risa Rodriguez MS, OTR/L [id number]

## 2024-04-22 DIAGNOSIS — E03.9 ACQUIRED HYPOTHYROIDISM: ICD-10-CM

## 2024-04-22 RX ORDER — LEVOTHYROXINE SODIUM 75 UG/1
75 TABLET ORAL DAILY
Qty: 90 TABLET | Refills: 1 | Status: SHIPPED | OUTPATIENT
Start: 2024-04-22

## 2024-04-23 ENCOUNTER — TREATMENT (OUTPATIENT)
Dept: OCCUPATIONAL THERAPY | Facility: CLINIC | Age: 79
End: 2024-04-23
Payer: MEDICARE

## 2024-04-23 DIAGNOSIS — M25.631 DECREASED RANGE OF MOTION OF RIGHT WRIST: ICD-10-CM

## 2024-04-23 DIAGNOSIS — S69.91XD INJURY OF WRIST, RIGHT, SUBSEQUENT ENCOUNTER: Primary | ICD-10-CM

## 2024-04-23 PROCEDURE — 97022 WHIRLPOOL THERAPY: CPT | Mod: GO

## 2024-04-23 PROCEDURE — 97140 MANUAL THERAPY 1/> REGIONS: CPT | Mod: GO

## 2024-04-23 PROCEDURE — 97530 THERAPEUTIC ACTIVITIES: CPT | Mod: GO,59

## 2024-04-23 NOTE — PROGRESS NOTES
"Occupational Therapy   Occupational Therapy Treatment    Patient Name: Bell Ruano  MRN: [id number]  Today's Date: 4/23/2024     Insurance:  Visit number: 2  Insurance Type: Medicare Part A and B, [facility]  Authorization info: [place]  Cert dates: 4/15/2024- 6/10/2024      Current Problem  1. Injury of wrist, right, subsequent encounter        2. Decreased range of motion of right wrist          Precautions     SUBJECTIVE:   \"The exercises make me feel better.\" Pt reports she was able to return to gardening/pulling weeds using right hand.     Pain:   3-4  /10  Location: Right dorsal wrist and thumb  Description: Ache    Performing HEP?: Yes    OBJECTIVE:   Visible increase in wrist active ROM observed since OT evaluation  Treatment:    Modalities: 15 min  Fluidotherapy treatment Right forearm, wrist and hand with AROM x 15 min    Therapeutic Exercise:  5  min  Review of self PROM wrist flex/extension G- plane with min verbal ques.     Manual Therapy:  10  min  STM right forearm musculature   Gentle PROM of forearm , wrist , thumb all planes    Therapeutic Activity: 15   min  ROM wand for F/A/ and wrist motion   Yellow flex bar 6# (palm up, down)  x 10 each  Patient fitted and issued a medium Comfort Cool wrist thumb orthosis for pain relief , joint support    Neuromuscular Re-education:  min    Orthosis:   min      Wound Care:     min      Self Care/ADL   min      Other Treatment:   min      ASSESSMENT:   Patient reports good, comfortable fit with splint and verbalizes good understanding of wear, care and precautions.  No increases in pain post tx session.     PLAN:   Continue with POC. Measure AROM NV    Risa Rodriguez MS, OTR/L [id number]         "

## 2024-04-24 ENCOUNTER — APPOINTMENT (OUTPATIENT)
Dept: OCCUPATIONAL THERAPY | Facility: CLINIC | Age: 79
End: 2024-04-24
Payer: MEDICARE

## 2024-04-30 ENCOUNTER — TREATMENT (OUTPATIENT)
Dept: OCCUPATIONAL THERAPY | Facility: CLINIC | Age: 79
End: 2024-04-30
Payer: MEDICARE

## 2024-04-30 DIAGNOSIS — S69.91XD INJURY OF WRIST, RIGHT, SUBSEQUENT ENCOUNTER: Primary | ICD-10-CM

## 2024-04-30 DIAGNOSIS — M25.631 DECREASED RANGE OF MOTION OF RIGHT WRIST: ICD-10-CM

## 2024-04-30 PROCEDURE — 97140 MANUAL THERAPY 1/> REGIONS: CPT | Mod: GO

## 2024-04-30 PROCEDURE — 97022 WHIRLPOOL THERAPY: CPT | Mod: GO

## 2024-04-30 PROCEDURE — 97110 THERAPEUTIC EXERCISES: CPT | Mod: GO

## 2024-04-30 NOTE — PROGRESS NOTES
"Occupational Therapy   Occupational Therapy Treatment    Patient Name: Bell Ruano  MRN: [id number]  Today's Date: 4/30/2024     Insurance:  Visit number: 3  Insurance Type: Medicare Part A and B, [employer]  Authorization info: [place]  Cert dates: 4/15/2024- 6/10/2024      Current Problem  1. Injury of wrist, right, subsequent encounter          Precautions     SUBJECTIVE:   \"It's hard to lift heavy things.\"     Pain:   1 1/2 /10  Location: Right dorsal wrist and thumb  Description: Ache, soreness     Performing HEP?: Yes    OBJECTIVE:    strength : R 32# from 25#    L 33#  Quick Dash : 18.18%    Treatment:    Modalities: 15 min  Fluidotherapy treatment Right forearm, wrist and hand with AROM x 15 min    Therapeutic Exercise: 15  min   strength taken. See above   UPGRADED HEP: Patient instructed on and completed with use of handout as follows:  Wrist flexion 1# x 10, wrist extension 1# x 10, RD 0 wt x 10, sup/pro 1# x 10  Handout issued.    Manual Therapy:  10  min  STM right forearm musculature   Gentle PROM of forearm , wrist , thumb all planes    Therapeutic Activity:   min     Neuromuscular Re-education:  min    Orthosis:   min    Wound Care:     min    Self Care/ADL   min    Other Treatment:   min      ASSESSMENT:  Good challenge and tolerance to progressed HEP , with no increases in pain levels.   Increased  strength by 7#, however continued difficulty with fxal household lifting.     PLAN:   Continue with POC. Review UE strengthening for HEP.     Risa Rodriguez MS, OTR/L [id number]             "

## 2024-05-07 ENCOUNTER — OFFICE VISIT (OUTPATIENT)
Dept: ORTHOPEDIC SURGERY | Facility: CLINIC | Age: 79
End: 2024-05-07
Payer: MEDICARE

## 2024-05-07 DIAGNOSIS — I10 HYPERTENSION, UNSPECIFIED TYPE: ICD-10-CM

## 2024-05-07 DIAGNOSIS — S69.91XA RIGHT WRIST INJURY, INITIAL ENCOUNTER: Primary | ICD-10-CM

## 2024-05-07 PROCEDURE — 1036F TOBACCO NON-USER: CPT | Performed by: ORTHOPAEDIC SURGERY

## 2024-05-07 PROCEDURE — 99213 OFFICE O/P EST LOW 20 MIN: CPT | Performed by: ORTHOPAEDIC SURGERY

## 2024-05-07 PROCEDURE — 1159F MED LIST DOCD IN RCRD: CPT | Performed by: ORTHOPAEDIC SURGERY

## 2024-05-07 PROCEDURE — 1160F RVW MEDS BY RX/DR IN RCRD: CPT | Performed by: ORTHOPAEDIC SURGERY

## 2024-05-07 RX ORDER — TRIAMTERENE AND HYDROCHLOROTHIAZIDE 37.5; 25 MG/1; MG/1
1 CAPSULE ORAL DAILY
Qty: 90 CAPSULE | Refills: 3 | Status: SHIPPED | OUTPATIENT
Start: 2024-05-07

## 2024-05-07 ASSESSMENT — PAIN - FUNCTIONAL ASSESSMENT: PAIN_FUNCTIONAL_ASSESSMENT: NO/DENIES PAIN

## 2024-05-07 NOTE — PROGRESS NOTES
History of Present Illness:  Chief Complaint   Patient presents with    Right Wrist - Follow-up     fracture     79-year-old female undergoing nonoperative treatment for right distal radius fracture.  Therapy has been very helpful and she feels like she has been able to resume many of her regular activities.  Still with some residual soreness, primarily about the ulnar aspect of the wrist.  This has been improving.  No numbness/tingling.    Past Medical History:   Diagnosis Date    Arthritis 2020    Cancer (Multi) 2016    Fracture of ankle 27 years ago    Other abnormal and inconclusive findings on diagnostic imaging of breast 01/11/2016    Mammogram abnormal    Other conditions influencing health status 03/15/2016    Seroma, postoperative    Other specified postprocedural states 03/07/2016    Post-operative state    Personal history of irradiation     Personal history of other specified conditions 11/18/2015    History of lump of right breast    Wrist sprain 2-21-24       Medication Documentation Review Audit       Reviewed by Cam Chavez MD (Physician) on 04/09/24 at 1455      Medication Order Taking? Sig Documenting Provider Last Dose Status   ascorbic acid, vitamin C, 500 mg capsule [id number]  Take by mouth. Historical Provider, MD  Active   atorvastatin (Lipitor) 10 mg tablet [id number]  Take 1 tablet (10 mg) by mouth once daily. Santiago Hutchinson MD  Active   atorvastatin (Lipitor) 20 mg tablet [id number]  Take 1 tablet (20 mg) by mouth once daily. Santiago Hutchinson MD  Active   biotin 1 mg tablet [id number]  Take by mouth. Historical MD Sarthak  Active   calcium carbonate (Tums) 200 mg calcium chewable tablet [id number]  Chew 2 tablets (1,000 mg) once daily. Patients choice. Historical Provider, MD  Active   ergocalciferol, vitamin D2, 50 mcg (2,000 unit) tablet [id number]  Take 50 mcg by mouth once daily. Historical Provider, MD  Active   fluticasone (Cutivate) 0.05 % cream [id number]  Apply topically 2 times a  day. Santiago Hutchinson MD  Active   latanoprost (Xalatan) 0.005 % ophthalmic solution [id number]  Administer into affected eye(s). Meryl De León MD  Active   levothyroxine (Synthroid, Levoxyl) 75 mcg tablet [id number]  Take 1 tablet (75 mcg) by mouth once daily. Santiago Hutchinson MD  Active   triamterene-hydrochlorothiazid (Dyazide) 37.5-25 mg capsule [id number]  Take 1 capsule by mouth once daily. Santiago Hutchinson MD  Active                    No Known Allergies    Social History     Socioeconomic History    Marital status:      Spouse name: Not on file    Number of children: Not on file    Years of education: Not on file    Highest education level: Not on file   Occupational History    Not on file   Tobacco Use    Smoking status: Former     Current packs/day: 0.00     Average packs/day: 1 pack/day for 20.0 years (20.0 ttl pk-yrs)     Types: Cigarettes     Start date: 1970     Quit date: 1990     Years since quittin.3    Smokeless tobacco: Never   Substance and Sexual Activity    Alcohol use: Not Currently    Drug use: Never    Sexual activity: Not Currently     Partners: Male     Birth control/protection: None   Other Topics Concern    Not on file   Social History Narrative    Not on file     Social Determinants of Health     Financial Resource Strain: Not on file   Food Insecurity: Not on file   Transportation Needs: Not on file   Physical Activity: Not on file   Stress: Not on file   Social Connections: Not on file   Intimate Partner Violence: Not on file   Housing Stability: Not on file       Past Surgical History:   Procedure Laterality Date    ANKLE FRACTURE SURGERY  27 years ago    ANKLE SURGERY  2015    Ankle Surgery    BREAST LUMPECTOMY  2016    Right Breast Lumpectomy    OTHER SURGICAL HISTORY  2018    Biopsy Pleural    TUBAL LIGATION  2015    Tubal Ligation        Review of Systems   GENERAL: Negative for malaise, significant weight loss,  fever  MUSCULOSKELETAL: see HPI  NEURO:  Negative     Physical Examination  Constitutional: Appears well-developed and well-nourished.  Head: Normocephalic and atraumatic.  Eyes: EOMI grossly  Cardiovascular: Intact distal pulses.   Respiratory: Effort normal. No respiratory distress.  Neurologic: Alert and oriented to person, place, and time.  Skin: Skin is warm and dry.  Hematologic / Lymphatic: No lymphedema, lymphangitis.  Psychiatric: normal mood and affect. Behavior is normal.   Musculoskeletal:  Right wrist: Minimal edema.  Skin intact.  No specific tenderness.  DRUJ stable.  EPL/FPL intact.  0 cm DPC.  2+ radial pulse.  55/50 degrees wrist flexion/extension.  80/80 degrees pronation/supination.    Assessment: Clinically progressing following nonoperative treatment for right distal radius fracture     Plan:  We discussed expected recovery course and she will continue with progression of activities as tolerated.  We did discuss recommendation for follow-up with primary care physician and potential workup for osteoporosis with a new DEXA scan.  She is planning on calling her primary care physician.  Follow-up in the future as needed.

## 2024-05-09 ENCOUNTER — TREATMENT (OUTPATIENT)
Dept: OCCUPATIONAL THERAPY | Facility: CLINIC | Age: 79
End: 2024-05-09
Payer: MEDICARE

## 2024-05-09 DIAGNOSIS — M25.631 DECREASED RANGE OF MOTION OF RIGHT WRIST: ICD-10-CM

## 2024-05-09 DIAGNOSIS — S69.91XD INJURY OF WRIST, RIGHT, SUBSEQUENT ENCOUNTER: Primary | ICD-10-CM

## 2024-05-09 PROCEDURE — 97140 MANUAL THERAPY 1/> REGIONS: CPT | Mod: GO

## 2024-05-09 PROCEDURE — 97022 WHIRLPOOL THERAPY: CPT | Mod: GO

## 2024-05-09 PROCEDURE — 97110 THERAPEUTIC EXERCISES: CPT | Mod: GO

## 2024-05-09 NOTE — PROGRESS NOTES
"Occupational Therapy   Occupational Therapy Treatment    Patient Name: Bell Ruano  MRN: [id number]  Today's Date: 5/9/2024     Insurance:  Visit number: 4  Insurance Type: Medicare Part A and B, [facility] [place]  Authorization info: [place]  Cert dates: 4/15/2024- 6/10/2024      Current Problem  1. Injury of wrist, right, subsequent encounter          Precautions     SUBJECTIVE:   \"I can lift a iron skillet.\" Pt reports being discharged from physician's care.    Pain:   0-1 /10  Location: Right dorsal wrist and thumb  Description: Ache, soreness     Performing HEP?: Yes    OBJECTIVE:   HAND STRENGTH (Lbs)   R L   Dynamometer  27 from 25 NE   Lateral Pinch 8.5 from 8    3jaw Pinch  Tip Pinch 7 from 5.5   7 from 6.5     Pronation 85 from 77  MMT : 4+/5  Supination 78 from 68   4+/5  Wrist flexion 74 from 57  4+/5  Wrist extension 56 from 55  4+/5  RD 10 from 8  UD 24 from 20  Quick Dash 9.09% from 18.18%    Treatment:    Modalities: 15 min  Fluidotherapy treatment Right forearm, wrist and hand with AROM x 15 min    Therapeutic Exercise: 15 min     Objective measurements taken. See above for details     Review of HEP , focus and progression    Manual Therapy:  15  min  STM right forearm musculature   Gentle PROM of forearm , wrist , thumb all planes    Therapeutic Activity:   min     Neuromuscular Re-education:  min    Orthosis:   min    Wound Care:     min    Self Care/ADL   min    Other Treatment:   min      ASSESSMENT:  Steady improvements with increased ROM, strength with decreased pain levels and good return to fxal activities.   PLAN:   Active       OT Goals       Patient to be independent with HEP to further fxal progress.  (Met)       Start:  04/15/24    Expected End:  06/14/24    Resolved:  05/09/24         Patient to increase right  AROM by 10 degrees of supination, wrist flexion,extension for increase ease and ability with ADLS.  (Met)       Start:  04/15/24    Expected End:  06/14/24    Resolved:  " 05/09/24         Patient to increase  strength to 30#  of hand for ease with lifting and carrying tasks and gardening.        Start:  04/15/24    Expected End:  06/14/24            Patient to increase m. strength to 4+/5 or better for increased ease ability for IADLS and gardening.  (Met)       Start:  04/15/24    Expected End:  06/14/24    Resolved:  05/09/24           Resolved       OT Problem       PATIENT WILL REPORT PAIN OF occasional 1/10 DEMONSTRATING A REDUCTION OF OVERALL PAIN (Met)       Start:  04/15/24    Expected End:  06/14/24    Resolved:  05/09/24              Discharge OT services. Patient completed 4  tx sessions. Pt in agreement with plan and will continue efforts outside of clinic with HEP.       Risa Rodriguez MS, OTR/L [id number]

## 2024-06-13 ENCOUNTER — TELEPHONE (OUTPATIENT)
Dept: PRIMARY CARE | Facility: CLINIC | Age: 79
End: 2024-06-13
Payer: MEDICARE

## 2024-06-13 DIAGNOSIS — M25.559 HIP PAIN, UNSPECIFIED LATERALITY: ICD-10-CM

## 2024-07-22 ENCOUNTER — OFFICE VISIT (OUTPATIENT)
Dept: ORTHOPEDIC SURGERY | Facility: CLINIC | Age: 79
End: 2024-07-22
Payer: MEDICARE

## 2024-07-22 ENCOUNTER — HOSPITAL ENCOUNTER (OUTPATIENT)
Dept: RADIOLOGY | Facility: CLINIC | Age: 79
Discharge: HOME | End: 2024-07-22
Payer: MEDICARE

## 2024-07-22 VITALS — BODY MASS INDEX: 23.18 KG/M2 | WEIGHT: 148 LBS

## 2024-07-22 DIAGNOSIS — M25.551 RIGHT HIP PAIN: ICD-10-CM

## 2024-07-22 DIAGNOSIS — M16.10 ARTHRITIS OF HIP: Primary | ICD-10-CM

## 2024-07-22 PROCEDURE — 73502 X-RAY EXAM HIP UNI 2-3 VIEWS: CPT | Mod: RIGHT SIDE | Performed by: RADIOLOGY

## 2024-07-22 PROCEDURE — 1125F AMNT PAIN NOTED PAIN PRSNT: CPT | Performed by: ORTHOPAEDIC SURGERY

## 2024-07-22 PROCEDURE — 99214 OFFICE O/P EST MOD 30 MIN: CPT | Performed by: ORTHOPAEDIC SURGERY

## 2024-07-22 PROCEDURE — 1159F MED LIST DOCD IN RCRD: CPT | Performed by: ORTHOPAEDIC SURGERY

## 2024-07-22 PROCEDURE — 73502 X-RAY EXAM HIP UNI 2-3 VIEWS: CPT | Mod: RT

## 2024-07-22 RX ORDER — MELOXICAM 15 MG/1
15 TABLET ORAL DAILY
Qty: 30 TABLET | Refills: 2 | Status: SHIPPED | OUTPATIENT
Start: 2024-07-22 | End: 2024-10-20

## 2024-07-22 ASSESSMENT — PAIN SCALES - GENERAL: PAINLEVEL_OUTOF10: 3

## 2024-07-22 ASSESSMENT — PAIN DESCRIPTION - DESCRIPTORS: DESCRIPTORS: ACHING;DULL

## 2024-07-22 ASSESSMENT — PAIN - FUNCTIONAL ASSESSMENT: PAIN_FUNCTIONAL_ASSESSMENT: 0-10

## 2024-07-22 NOTE — PROGRESS NOTES
Patient was reviewed and discussed with OLYA and/or orthopedic resident.  Patient was seen and evaluated in conjunction with OLYA and/or orthopedic resident. Findings and treatment plan were discussed and approved by myself, Dr. Coleman.    Exam: Limited internal rotation of the hip.  Pain with rotation.  Good hip flexion strength.    I personally reviewed the following radiographic exams: AP pelvis and right hip shows moderate right hip arthrosis.  No acute change.    Assessment: Right hip arthritis.    Plan: Discussed nonoperative and operative options in detail.   Risk and benefits discussed in detail. All questions answered today.  Recovery timeline and expectations discussed in detail.  Discussed possible diagnostic/therapeutic injection.  Will try some physical therapy is unlikely to make a huge difference.  Will place on meloxicam.  Discussed possible hip replacement in the future.

## 2024-07-22 NOTE — PROGRESS NOTES
79F here for 2-3 months of right posterior and anterior hip pain. Pain is equivalent anterior/posterior and is atraumatic. Symptoms made worse with activity such as walking, exercise. Patient claims her pain is limiting her ability to walk her dog or exercise which she typically enjoys. Patient has seen a chiropractor but no treatment for her hip. She denies numbness/tingling down her right lower extremity.     PMH: HTN, hypothyroid. No DM, GI, kidney, blood issues.     Exam:  Right SI pain  Right SI pain with ER hip, anterior groin pain with IR  Right hip flexion/ER/IR moderately limited compared to left.   NVI/SILT distally    XR pelvis and right hiup reviewed demonstrating advanced right femoroacetabular osteoarthritis.     Assessment:  Right hip osteoarthritis  Right SI osteoarthritis    Plan:  Discussed typical clinical course, operative and nonoperative treatment options, pros/cons of each.   At this time will proceed with conservative management to include mobic, PT.   Patient will call should these fail so we can send script for right hip CSI.   Follow up as needed.

## 2024-08-13 ENCOUNTER — EVALUATION (OUTPATIENT)
Dept: PHYSICAL THERAPY | Facility: CLINIC | Age: 79
End: 2024-08-13
Payer: MEDICARE

## 2024-08-13 DIAGNOSIS — M25.551 RIGHT HIP PAIN: ICD-10-CM

## 2024-08-13 DIAGNOSIS — M16.11 PRIMARY OSTEOARTHRITIS OF RIGHT HIP: Primary | ICD-10-CM

## 2024-08-13 DIAGNOSIS — M16.10 ARTHRITIS OF HIP: ICD-10-CM

## 2024-08-13 PROCEDURE — 97110 THERAPEUTIC EXERCISES: CPT | Mod: GP | Performed by: PHYSICAL THERAPIST

## 2024-08-13 PROCEDURE — 97161 PT EVAL LOW COMPLEX 20 MIN: CPT | Mod: GP | Performed by: PHYSICAL THERAPIST

## 2024-08-13 ASSESSMENT — ENCOUNTER SYMPTOMS
DEPRESSION: 0
LOSS OF SENSATION IN FEET: 0
OCCASIONAL FEELINGS OF UNSTEADINESS: 0

## 2024-08-13 NOTE — PROGRESS NOTES
"Physical Therapy Evaluation    Patient Name: Bell Ruano  MRN: [id number]  Today's Date: 8/13/2024  Visit: 1  Referred by: Dr. Coleman  Diagnosis:   1. Primary osteoarthritis of right hip        2. Right hip pain        PRECAUTIONS:   none    SUBJECTIVE:  79 y.o. english speaking female with long standing R) hip pain and stiffness.  Worse since 4/10 when has been sitting more secondary to  ill.  Worse: steps, prolonged standing  Better: walking,   Pain:  Today 2/10, W- 4/10, B- 1/10  Home Living:  Lives with  in their 2 story home.  Prior level of function:  Walks twice a day for ~1/4 mile each.  Personal factors that may impact care:   and lives in own home  OBJECTIVE:  10* hip flexor contracture.  ROM: flex  95, ER- 30, IR- 10, EXT: -10  4/5 hip EXT and ABD   ER 4+/5  Impingement scour: painful 10:00 to 1:00  RADHA extremely limited and painful  Pt. With capsular pattern and restricted joint play R) hip.  Outcome Measure:  HOS- 51%   ,  LEFS- 44%    ASSESSMENT:  Pt. With advanced R) hip OA with pain, limitation of motion, and decreased strength which limits her functional tolerances.  She requires PT to address these issues.    Problem list:  see above    Low complexity due to patient's clinical presentation being stable and uncomplicated by any significant comorbidities that may affect rehab tolerance and progression.     Clinical presentation:  Stable and/or uncomplicated characteristics,     TREATMENT:  - Therex:  SKTC  5\" x 10  Bent leg fallouts  5\" x 10  Omar hip flexor S  x 30\"  TB HL hip ABD  Toño  5\" x 10  Standing TB Hip ABD, EXT  Toño  2x10 ea    PATIENT EDUCATION:  HEP    PLAN:   Daily HEP  PT 1-2x/week x 8 weeks to progress towards PT goals  Rehab potential: good  Plan of care agreement: Y    GOALS:  Active       PT Problem       Pt will have improved pain free ROM of hip       Start:  08/13/24    Expected End:  10/25/24            Pt will have improved LE strength       " Start:  08/13/24    Expected End:  10/25/24            Pt will have improved HOS score by at least 15%       Start:  08/13/24    Expected End:  10/25/24            Pt will be independent with HEP       Start:  08/13/24    Expected End:  09/27/24            Pt will have decreased reports of pain by at least 2 levels       Start:  08/13/24    Expected End:  09/27/24

## 2024-08-22 ENCOUNTER — TREATMENT (OUTPATIENT)
Dept: PHYSICAL THERAPY | Facility: CLINIC | Age: 79
End: 2024-08-22
Payer: MEDICARE

## 2024-08-22 DIAGNOSIS — M16.11 PRIMARY OSTEOARTHRITIS OF RIGHT HIP: ICD-10-CM

## 2024-08-22 DIAGNOSIS — M25.551 RIGHT HIP PAIN: ICD-10-CM

## 2024-08-22 PROCEDURE — 97110 THERAPEUTIC EXERCISES: CPT | Mod: GP | Performed by: PHYSICAL THERAPIST

## 2024-08-22 NOTE — PROGRESS NOTES
"Physical Therapy Treatment    Patient Name: Bell Ruano  MRN: [id number]  Today's Date: 8/22/2024  Visit: 2  Referred by: Dr. Coleman  Diagnosis:   1. Primary osteoarthritis of right hip  Follow Up In Physical Therapy      2. Right hip pain  Follow Up In Physical Therapy      PRECAUTIONS:   none    SUBJECTIVE:  79 y.o. female who returns for R) hip OA / pain and stiffness.    Pain and stiffness are improved.    Still worse with steps, prolonged standing  HEP: Y    Pain:  0- 4/10     OBJECTIVE:  10* hip flexor contracture.  ROM: flex  95, ER- 30, IR- 10, EXT: -10  4/5 hip EXT and ABD   ER 4+/5  Impingement scour: painful 10:00 to 1:00  RADHA extremely limited and painful  Pt. With capsular pattern and restricted joint play R) hip.  Outcome Measure:  HOS- 51%   ,  LEFS- 44%    ASSESSMENT:  Pt. With advanced R) hip OA/pain.  She tolerated today's session well.    TREATMENT:  - Therex:  Prone knee bends  x 30  Prone ER/IR  x 30  SKTC  5\" x 10  Bent leg fallouts  5\" x 10 x 2  Omar hip flexor S  3 x 30\"  TB HL hip ABD  Toño  5\" x 10 x 3  MultiHip EXT  4  3x10  MultiHip ABD  2  3x10    Manual Therapy: Belt lateral hip distraction mob.R) hip.  Prone p/a hip mob's R)  Manual R) hip flexor S in SL     PLAN:   Continue daily HEP  F/U next week and continue MT and ex as able.  GOALS:  Active       PT Problem       Pt will have improved pain free ROM of hip       Start:  08/13/24    Expected End:  10/25/24            Pt will have improved LE strength       Start:  08/13/24    Expected End:  10/25/24            Pt will have improved HOS score by at least 15%       Start:  08/13/24    Expected End:  10/25/24            Pt will be independent with HEP       Start:  08/13/24    Expected End:  09/27/24            Pt will have decreased reports of pain by at least 2 levels       Start:  08/13/24    Expected End:  09/27/24                "

## 2024-08-29 ENCOUNTER — TREATMENT (OUTPATIENT)
Dept: PHYSICAL THERAPY | Facility: CLINIC | Age: 79
End: 2024-08-29
Payer: MEDICARE

## 2024-08-29 DIAGNOSIS — M25.551 RIGHT HIP PAIN: ICD-10-CM

## 2024-08-29 DIAGNOSIS — M16.11 PRIMARY OSTEOARTHRITIS OF RIGHT HIP: ICD-10-CM

## 2024-08-29 PROCEDURE — 97110 THERAPEUTIC EXERCISES: CPT | Mod: GP | Performed by: PHYSICAL THERAPIST

## 2024-08-29 PROCEDURE — 97140 MANUAL THERAPY 1/> REGIONS: CPT | Mod: GP | Performed by: PHYSICAL THERAPIST

## 2024-08-29 NOTE — PROGRESS NOTES
"Physical Therapy Treatment    Patient Name: Bell Ruano  MRN: [id number]  Today's Date: 8/29/2024  Visit: 3  Referred by: Dr. Coleman  Diagnosis:   1. Primary osteoarthritis of right hip  Follow Up In Physical Therapy      2. Right hip pain  Follow Up In Physical Therapy      PRECAUTIONS:   none    SUBJECTIVE:  79 y.o. female who returns for R) hip OA / pain and stiffness.    She reports that her pain and stiffness are still improved.  However, the symptoms do wax and wane depending on much she is up on her feet.    HEP: Y    Pain:  0- 4/10     OBJECTIVE:  10* hip flexor contracture.  ROM: flex  95, ER- 30, IR- 10, EXT: -5  4/5 hip EXT and ABD   ER 4+/5  Impingement scour: painful 10:00 to 1:00  RADHA extremely limited and painful  Pt. With capsular pattern and restricted joint play R) hip.  Outcome Measure:  HOS- 51%   ,  LEFS- 44%    ASSESSMENT:  Pt. With advanced R) hip OA/pain.  She tolerated today's session well.  Improved Hip extension today on R).    TREATMENT:  - Therex:  Prone knee bends  x 30  Prone ER/IR   1.5#  x 30  SKTC  10\" x 10  Bent leg fallouts    1.5#  10\" x 10   Omar hip flexor S  2 x 60\"  TB HL hip ABD  Toño  5\" x 10 x 3  MultiHip EXT  4  3x10  MultiHip ABD  2  3x10  Step hip flexor S  30\" x 3    Manual Therapy: Belt lateral hip distraction mob.R) hip.  Prone p/a hip mob's R)  Manual R) hip flexor S in SL     PLAN:   Continue daily HEP  F/U next week and continue MT and ex as able.  GOALS:  Active       PT Problem       Pt will have improved pain free ROM of hip       Start:  08/13/24    Expected End:  10/25/24            Pt will have improved LE strength       Start:  08/13/24    Expected End:  10/25/24            Pt will have improved HOS score by at least 15%       Start:  08/13/24    Expected End:  10/25/24            Pt will be independent with HEP       Start:  08/13/24    Expected End:  09/27/24            Pt will have decreased reports of pain by at least 2 levels       Start:  " 08/13/24    Expected End:  09/27/24

## 2024-09-05 ENCOUNTER — TREATMENT (OUTPATIENT)
Dept: PHYSICAL THERAPY | Facility: CLINIC | Age: 79
End: 2024-09-05
Payer: MEDICARE

## 2024-09-05 DIAGNOSIS — M16.11 PRIMARY OSTEOARTHRITIS OF RIGHT HIP: ICD-10-CM

## 2024-09-05 DIAGNOSIS — M25.551 RIGHT HIP PAIN: ICD-10-CM

## 2024-09-05 PROCEDURE — 97110 THERAPEUTIC EXERCISES: CPT | Mod: GP | Performed by: PHYSICAL THERAPIST

## 2024-09-05 PROCEDURE — 97140 MANUAL THERAPY 1/> REGIONS: CPT | Mod: GP | Performed by: PHYSICAL THERAPIST

## 2024-09-05 NOTE — PROGRESS NOTES
"Physical Therapy Treatment    Patient Name: Bell Ruano  MRN: [id number]  Today's Date: 9/5/2024  Visit: 4  Referred by: Dr. Coleman  Diagnosis:   1. Primary osteoarthritis of right hip  Follow Up In Physical Therapy      2. Right hip pain  Follow Up In Physical Therapy      PRECAUTIONS:   none    SUBJECTIVE:  79 y.o. female who returns for R) hip OA / pain and stiffness.    She reports that her pain and stiffness  still wax and wane depending on much she is up on her feet.  It is about the same as last session.    HEP: Y    Pain:  0- 4/10     OBJECTIVE:  10* hip flexor contracture.  ROM: flex  95, ER- 30, IR- 10, EXT: -5  4/5 hip EXT and ABD   ER 4+/5  Impingement scour: painful 10:00 to 1:00  RADHA extremely limited and painful  Pt. With capsular pattern and restricted joint play R) hip.  Outcome Measure:  HOS- 51%   ,  LEFS- 44%    ASSESSMENT:  Pt. With advanced R) hip OA/pain.  She tolerated today's session well.  Improved Hip extension today on R).    TREATMENT:  - Therex:  Prone knee bends  x 30  Prone ER/IR   1.5#  x 30  SKTC  10\" x 10  Bent leg fallouts    2#  10\" x 12   Omar hip flexor S  2 x 60\"  TB HL hip ABD  Toño  5\" x 15 x 3  MultiHip EXT  4  3x10  MultiHip ABD  2  3x10  MultiHip Flex  2  3x10  Step hip flexor S  30\" x 3    Manual Therapy: Belt lateral hip distraction mob.R) hip.  Prone p/a hip mob's R)  Manual R) hip flexor S in SL     PLAN:   Continue daily HEP  F/U next week and continue MT and ex as able.  GOALS:  Active       PT Problem       Pt will have improved pain free ROM of hip       Start:  08/13/24    Expected End:  10/25/24            Pt will have improved LE strength       Start:  08/13/24    Expected End:  10/25/24            Pt will have improved HOS score by at least 15%       Start:  08/13/24    Expected End:  10/25/24            Pt will be independent with HEP       Start:  08/13/24    Expected End:  09/27/24            Pt will have decreased reports of pain by at least 2 " levels       Start:  08/13/24    Expected End:  09/27/24

## 2024-09-11 ENCOUNTER — TREATMENT (OUTPATIENT)
Dept: PHYSICAL THERAPY | Facility: CLINIC | Age: 79
End: 2024-09-11
Payer: MEDICARE

## 2024-09-11 DIAGNOSIS — M16.11 PRIMARY OSTEOARTHRITIS OF RIGHT HIP: ICD-10-CM

## 2024-09-11 DIAGNOSIS — M25.551 RIGHT HIP PAIN: ICD-10-CM

## 2024-09-11 PROCEDURE — 97110 THERAPEUTIC EXERCISES: CPT | Mod: GP | Performed by: PHYSICAL THERAPIST

## 2024-09-11 PROCEDURE — 97140 MANUAL THERAPY 1/> REGIONS: CPT | Mod: GP | Performed by: PHYSICAL THERAPIST

## 2024-09-11 NOTE — PROGRESS NOTES
"Physical Therapy Treatment    Patient Name: Bell Ruano  MRN: [id number]  Today's Date: 9/11/2024  Visit: 5  Referred by: Dr. Coleman  Diagnosis:   1. Primary osteoarthritis of right hip  Follow Up In Physical Therapy      2. Right hip pain  Follow Up In Physical Therapy      PRECAUTIONS:   none    SUBJECTIVE:  79 y.o. female who returns for R) hip OA / pain and stiffness.    She continues to report improved mobility and decreased hip pain since starting PT.  However, still limited ROM.  HEP: Y  Pain:  0- 4/10     OBJECTIVE:  10* hip flexor contracture.  ROM: flex  95, ER- 30, IR- 10, EXT: -5  4/5 hip EXT and ABD   ER 4+/5  Impingement scour: painful 10:00 to 1:00  RADHA extremely limited and painful  Pt. With capsular pattern and restricted joint play R) hip.  Outcome Measure:  HOS- 51%   ,  LEFS- 44%    ASSESSMENT:  Pt. With advanced R) hip OA/pain.  She tolerated today's session well.  Improved Hip extension today on R).    TREATMENT:  - Therex:  Prone knee bends  x 30  Prone ER/IR   1.5#  x 30  SKTC  10\" x 10  Bent leg fallouts    2#  10\" x 15   Omar hip flexor S  2 x 60\"  TB HL hip ABD  Toño  5\" x 15 x 3  MultiHip EXT  4  3x10  MultiHip ABD  2  3x10  MultiHip Flex  2  3x10  Step hip flexor S  30\" x 3    Manual Therapy: Belt lateral hip distraction mob.R) hip.  Prone p/a hip mob's R)  Manual R) hip flexor S in SL     PLAN:   Continue daily HEP  F/U next week and continue MT and ex as able.  GOALS:  Active       PT Problem       Pt will have improved pain free ROM of hip       Start:  08/13/24    Expected End:  10/25/24            Pt will have improved LE strength       Start:  08/13/24    Expected End:  10/25/24            Pt will have improved HOS score by at least 15%       Start:  08/13/24    Expected End:  10/25/24            Pt will be independent with HEP       Start:  08/13/24    Expected End:  09/27/24            Pt will have decreased reports of pain by at least 2 levels       Start:  08/13/24    " Expected End:  09/27/24

## 2024-09-19 ENCOUNTER — TREATMENT (OUTPATIENT)
Dept: PHYSICAL THERAPY | Facility: CLINIC | Age: 79
End: 2024-09-19
Payer: MEDICARE

## 2024-09-19 DIAGNOSIS — M16.11 PRIMARY OSTEOARTHRITIS OF RIGHT HIP: Primary | ICD-10-CM

## 2024-09-19 DIAGNOSIS — M25.551 RIGHT HIP PAIN: ICD-10-CM

## 2024-09-19 PROCEDURE — 97140 MANUAL THERAPY 1/> REGIONS: CPT | Mod: GP | Performed by: PHYSICAL THERAPIST

## 2024-09-19 PROCEDURE — 97110 THERAPEUTIC EXERCISES: CPT | Mod: GP | Performed by: PHYSICAL THERAPIST

## 2024-09-19 NOTE — PROGRESS NOTES
"Physical Therapy Treatment    Patient Name: Bell Ruano  MRN: [id number]  Today's Date: 9/19/2024  Visit: 6  Referred by: Dr. Coleman  Diagnosis:   1. Primary osteoarthritis of right hip        2. Right hip pain        PRECAUTIONS:   none    SUBJECTIVE:  79 y.o. female who returns for R) hip OA / pain and stiffness.    She continues to report improved mobility and decreased hip pain since starting PT.  However, still limited ROM.  Improved walking tolerances reported.  HEP: Y  Pain:  0- 4/10     OBJECTIVE:  10* hip flexor contracture.  ROM: flex  100, ER- 35, IR- 15, EXT: 0  4/5 hip EXT and ABD   ER 4+/5  Impingement scour: painful 10:00 to 1:00  RADHA extremely limited and painful  Pt. With capsular pattern and restricted joint play R) hip.  Outcome Measure:  HOS- 51%   ,  LEFS- 44%    ASSESSMENT:  Pt. With advanced R) hip OA/pain.  She tolerated today's session well.  Modest improvements in ROM today on R).    TREATMENT:  - Therex:  Prone knee bends  x 30  Prone ER/IR   2#  x 30  SKTC  10\" x 10  Bent leg fallouts    2#  10\" x 15   Omar hip flexor S  2 x 60\"  TB HL hip ABD  Toño  5\" x 15 x 3  MultiHip EXT  4  3x10  MultiHip ABD  2  3x10  MultiHip Flex  2  3x10  Step hip flexor S  30\" x 3    Manual Therapy: Belt lateral hip distraction mob.R) hip.  Prone p/a hip mob's R)  Manual R) hip flexor S in SL     PLAN:   Continue daily HEP  F/U next week and continue MT and ex as able.  Plan is to continue PT for 3 more sessions.  GOALS:  Active       PT Problem       Pt will have improved pain free ROM of hip       Start:  08/13/24    Expected End:  10/25/24            Pt will have improved LE strength       Start:  08/13/24    Expected End:  10/25/24            Pt will have improved HOS score by at least 15%       Start:  08/13/24    Expected End:  10/25/24            Pt will be independent with HEP       Start:  08/13/24    Expected End:  09/27/24            Pt will have decreased reports of pain by at least 2 " levels       Start:  08/13/24    Expected End:  09/27/24

## 2024-09-26 ENCOUNTER — APPOINTMENT (OUTPATIENT)
Dept: PHYSICAL THERAPY | Facility: CLINIC | Age: 79
End: 2024-09-26
Payer: MEDICARE

## 2024-10-03 ENCOUNTER — TREATMENT (OUTPATIENT)
Dept: PHYSICAL THERAPY | Facility: CLINIC | Age: 79
End: 2024-10-03
Payer: MEDICARE

## 2024-10-03 DIAGNOSIS — M16.11 PRIMARY OSTEOARTHRITIS OF RIGHT HIP: ICD-10-CM

## 2024-10-03 DIAGNOSIS — M25.551 RIGHT HIP PAIN: ICD-10-CM

## 2024-10-03 PROCEDURE — 97110 THERAPEUTIC EXERCISES: CPT | Mod: GP | Performed by: PHYSICAL THERAPIST

## 2024-10-03 PROCEDURE — 97140 MANUAL THERAPY 1/> REGIONS: CPT | Mod: GP | Performed by: PHYSICAL THERAPIST

## 2024-10-03 NOTE — PROGRESS NOTES
"Physical Therapy Treatment    Patient Name: Bell Ruano  MRN: [id number]  Today's Date: 10/3/2024  Visit: 7  Referred by: Dr. Coleman  Diagnosis:   1. Primary osteoarthritis of right hip  Follow Up In Physical Therapy      2. Right hip pain  Follow Up In Physical Therapy      PRECAUTIONS:   none    SUBJECTIVE:  79 y.o. female who returns for R) hip OA / pain and stiffness.    She continues to report improved mobility and decreased hip pain.  Also improved walking tolerances reported.  HEP: Y  Pain:  0- 4/10     OBJECTIVE:  10* hip flexor contracture.  ROM: flex  100, ER- 35, IR- 15, EXT: 7  4/5 hip EXT and ABD   ER 4+/5  Impingement scour: painful 10:00 to 1:00  RADHA extremely limited and painful  Pt. With capsular pattern and restricted joint play R) hip.  Outcome Measure:  HOS- 51%   ,  LEFS- 44%    ASSESSMENT:  Pt. With advanced R) hip OA/pain.  She tolerated today's session well.  She continues to report improved ROM and decreased ache in hip.    TREATMENT:  - Therex:  Prone knee bends  x 30  Prone ER/IR   2#  x 30  SKTC  10\" x 10  Bent leg fallouts    2#  10\" x 15   Omar hip flexor S  2 x 60\"  TB HL hip ABD  Blk  5\" x 10 x 3  MultiHip EXT  4.5  3x10  MultiHip ABD  2.5  3x10  MultiHip Flex  2.5  3x10  Step hip flexor S  30\" x 3    Manual Therapy: Belt lateral hip distraction mob.R) hip.  Prone p/a hip mob's R)  Manual R) hip flexor S in SL     PLAN:   Continue daily HEP  F/U next week and continue MT and ex as able.  Plan is to continue PT for 2 more sessions.  GOALS:  Active       PT Problem       Pt will have improved pain free ROM of hip       Start:  08/13/24    Expected End:  10/25/24            Pt will have improved LE strength       Start:  08/13/24    Expected End:  10/25/24            Pt will have improved HOS score by at least 15%       Start:  08/13/24    Expected End:  10/25/24            Pt will be independent with HEP       Start:  08/13/24    Expected End:  09/27/24            Pt will have " decreased reports of pain by at least 2 levels       Start:  08/13/24    Expected End:  09/27/24

## 2024-10-08 ENCOUNTER — TREATMENT (OUTPATIENT)
Dept: PHYSICAL THERAPY | Facility: CLINIC | Age: 79
End: 2024-10-08
Payer: MEDICARE

## 2024-10-08 DIAGNOSIS — M25.551 RIGHT HIP PAIN: ICD-10-CM

## 2024-10-08 DIAGNOSIS — M16.11 PRIMARY OSTEOARTHRITIS OF RIGHT HIP: Primary | ICD-10-CM

## 2024-10-08 PROCEDURE — 97110 THERAPEUTIC EXERCISES: CPT | Mod: GP | Performed by: PHYSICAL THERAPIST

## 2024-10-08 PROCEDURE — 97140 MANUAL THERAPY 1/> REGIONS: CPT | Mod: GP | Performed by: PHYSICAL THERAPIST

## 2024-10-08 NOTE — PROGRESS NOTES
"Physical Therapy Treatment    Patient Name: Bell Ruano  MRN: [id number]  Today's Date: 10/8/2024  Visit: 8  Referred by: Dr. Coleman  Diagnosis:   1. Primary osteoarthritis of right hip  Follow Up In Physical Therapy      2. Right hip pain  Follow Up In Physical Therapy        PRECAUTIONS:   none    SUBJECTIVE:  79 y.o. female who returns for R) hip OA / pain and stiffness.    She continues to report improved mobility and decreased hip pain.   HEP: Y  Pain:  0- 4/10     OBJECTIVE:  10* hip flexor contracture.  ROM: flex  100, ER- 35, IR- 15, EXT: 7  4/5 hip EXT and ABD   ER 4+/5  Impingement scour: painful 10:00 to 1:00  RADHA extremely limited and painful  Pt. With capsular pattern and restricted joint play R) hip.  Outcome Measure:  HOS- 51%   ,  LEFS- 44%    ASSESSMENT:  Pt. With advanced R) hip OA/pain.  She tolerated today's session well.  She continues to report improved ROM and decreased ache in hip.    TREATMENT:  - Therex:  Prone knee bends  x 30  Prone ER/IR   2.5#  x 30  SKTC  10\" x 10  Bent leg fallouts    2.5#  10\" x 15   Omar hip flexor S  2 x 60\"  TB HL hip ABD  Blk  5\" x 10 x 3  MultiHip EXT  4.5  3x12  MultiHip ABD  2.5  3x12  MultiHip Flex  2.5  3x12  Step hip flexor S  30\" x 2    Manual Therapy: Belt lateral hip distraction mob.R) hip.  Prone p/a hip mob's R)  Manual R) hip flexor S in SL     PLAN:   Continue daily HEP  F/U next week and continue MT and ex as able.  Plan is to continue PT for 1 more sessions.  GOALS:  Active       PT Problem       Pt will have improved pain free ROM of hip (Progressing)       Start:  08/13/24    Expected End:  10/25/24            Pt will have improved LE strength (Progressing)       Start:  08/13/24    Expected End:  10/25/24            Pt will have improved HOS score by at least 15% (Progressing)       Start:  08/13/24    Expected End:  10/25/24            Pt will be independent with HEP (Met)       Start:  08/13/24    Expected End:  09/27/24    Resolved: "  10/08/24         Pt will have decreased reports of pain by at least 2 levels (Met)       Start:  08/13/24    Expected End:  09/27/24    Resolved:  10/08/24

## 2024-10-14 ENCOUNTER — OFFICE VISIT (OUTPATIENT)
Dept: PRIMARY CARE | Facility: CLINIC | Age: 79
End: 2024-10-14
Payer: COMMERCIAL

## 2024-10-14 DIAGNOSIS — Z23 FLU VACCINE NEED: ICD-10-CM

## 2024-10-14 PROCEDURE — G0008 ADMIN INFLUENZA VIRUS VAC: HCPCS | Performed by: INTERNAL MEDICINE

## 2024-10-14 PROCEDURE — 99211 OFF/OP EST MAY X REQ PHY/QHP: CPT | Performed by: INTERNAL MEDICINE

## 2024-10-14 PROCEDURE — 90662 IIV NO PRSV INCREASED AG IM: CPT | Performed by: INTERNAL MEDICINE

## 2024-10-18 DIAGNOSIS — E03.9 ACQUIRED HYPOTHYROIDISM: ICD-10-CM

## 2024-10-18 RX ORDER — LEVOTHYROXINE SODIUM 75 UG/1
75 TABLET ORAL DAILY
Qty: 90 TABLET | Refills: 3 | Status: SHIPPED | OUTPATIENT
Start: 2024-10-18

## 2024-10-23 ENCOUNTER — TREATMENT (OUTPATIENT)
Dept: PHYSICAL THERAPY | Facility: CLINIC | Age: 79
End: 2024-10-23
Payer: MEDICARE

## 2024-10-23 DIAGNOSIS — M16.11 PRIMARY OSTEOARTHRITIS OF RIGHT HIP: Primary | ICD-10-CM

## 2024-10-23 DIAGNOSIS — M25.551 RIGHT HIP PAIN: ICD-10-CM

## 2024-10-23 PROCEDURE — 97110 THERAPEUTIC EXERCISES: CPT | Mod: GP,CQ | Performed by: PHYSICAL THERAPY ASSISTANT

## 2024-10-23 PROCEDURE — 97140 MANUAL THERAPY 1/> REGIONS: CPT | Mod: GP,CQ | Performed by: PHYSICAL THERAPY ASSISTANT

## 2024-10-23 NOTE — PROGRESS NOTES
"Physical Therapy Treatment    Patient Name: Bell Ruano  MRN: [id number]  Today's Date: 10/23/2024  Visit: 9  Referred by: Dr. Coleman  Diagnosis:   1. Primary osteoarthritis of right hip        2. Right hip pain            PRECAUTIONS:   none    SUBJECTIVE:  79 y.o. female who returns for R) hip OA / pain and stiffness.      HEP: Y  Pain:  0- 4/10     OBJECTIVE:  10* hip flexor contracture.  ROM: flex  100, ER- 35, IR- 15, EXT: 7  4/5 hip EXT and ABD   ER 4+/5  Impingement scour: painful 10:00 to 1:00  RADHA extremely limited and painful  Pt. With capsular pattern and restricted joint play R) hip.  Outcome Measure:  HOS- 51%   ,  LEFS- 44%    ASSESSMENT:  Responded well to exercises and manual. Felt better after Rx. She has one more remaining and may transition to HEP. We discussed potential SONG but at this time d/t her living situation the time isn't appropriate.     TREATMENT:  - Therex:  Bike 5', L5  Prone hip ext x15  Prone ER/IR   2.5#  3x12  S/L hip abd 1 1/2# 2x10  SKTC  10\" x 10  H/L bridges 5\"x15  Bent leg fallouts    2.5#  10\" x 15   Omar hip flexor S  2 x 60\"  TB HL hip ABD  Blk  5\" x 10 x 3  MultiHip EXT  (5)  2x10  MultiHip ABD  (3) 2x10  MultiHip Flex  (3)  2x10  Step hip flexor S  30\" x 2    Manual Therapy: Belt lateral hip distraction mob.R) hip.  Prone p/a hip mob's R)  Manual R) hip flexor S in SL     PLAN:   Continue daily HEP  F/U next week and continue MT and ex as able.  Plan is to continue PT for 1 more sessions.  GOALS:  Active       PT Problem       Pt will have improved pain free ROM of hip (Progressing)       Start:  08/13/24    Expected End:  10/25/24            Pt will have improved LE strength (Progressing)       Start:  08/13/24    Expected End:  10/25/24            Pt will have improved HOS score by at least 15% (Progressing)       Start:  08/13/24    Expected End:  10/25/24            Pt will be independent with HEP (Met)       Start:  08/13/24    Expected End:  09/27/24    " Resolved:  10/08/24         Pt will have decreased reports of pain by at least 2 levels (Met)       Start:  08/13/24    Expected End:  09/27/24    Resolved:  10/08/24

## 2024-10-29 ENCOUNTER — APPOINTMENT (OUTPATIENT)
Dept: PHYSICAL THERAPY | Facility: CLINIC | Age: 79
End: 2024-10-29
Payer: MEDICARE

## 2024-11-07 ENCOUNTER — OFFICE VISIT (OUTPATIENT)
Dept: ORTHOPEDIC SURGERY | Facility: CLINIC | Age: 79
End: 2024-11-07
Payer: MEDICARE

## 2024-11-07 DIAGNOSIS — M16.10 ARTHRITIS OF HIP: ICD-10-CM

## 2024-11-07 DIAGNOSIS — M25.551 RIGHT HIP PAIN: ICD-10-CM

## 2024-11-07 PROCEDURE — 1036F TOBACCO NON-USER: CPT | Performed by: ORTHOPAEDIC SURGERY

## 2024-11-07 PROCEDURE — 1159F MED LIST DOCD IN RCRD: CPT | Performed by: ORTHOPAEDIC SURGERY

## 2024-11-07 PROCEDURE — 99213 OFFICE O/P EST LOW 20 MIN: CPT | Performed by: ORTHOPAEDIC SURGERY

## 2024-11-07 PROCEDURE — 1125F AMNT PAIN NOTED PAIN PRSNT: CPT | Performed by: ORTHOPAEDIC SURGERY

## 2024-11-07 ASSESSMENT — PAIN SCALES - GENERAL: PAINLEVEL_OUTOF10: 3

## 2024-11-07 ASSESSMENT — PAIN - FUNCTIONAL ASSESSMENT: PAIN_FUNCTIONAL_ASSESSMENT: 0-10

## 2024-11-07 ASSESSMENT — PAIN DESCRIPTION - DESCRIPTORS: DESCRIPTORS: ACHING

## 2024-11-07 NOTE — PROGRESS NOTES
Returns for right hip.  Has done therapy and feels strong but still having significant groin pain.  Occasional pain down the posterior lateral buttocks.  Cannot go ahead with hip replacement this time.  She would like to go ahead and schedule hip injection.    Exam: Unchanged.    I personally reviewed the following radiographic exams: Previous x-rays of the hip reviewed.    Assessment: Right hip arthrosis.    Plan: Discussed nonoperative and operative options in detail.   Risk and benefits discussed in detail. All questions answered today.  Recovery timeline and expectations discussed in detail.  Discussed purpose of hip injection for diagnostic/therapeutic purposes.  Discussed expected response.  Discussed hip replacement to be delayed 3 months after any injection.  She is dealing with some family issues and will likely consider it next year.  Refer to Dr. Valdes for right hip injection.

## 2024-12-04 ENCOUNTER — HOSPITAL ENCOUNTER (OUTPATIENT)
Dept: RADIOLOGY | Facility: EXTERNAL LOCATION | Age: 79
Discharge: HOME | End: 2024-12-04

## 2024-12-04 ENCOUNTER — OFFICE VISIT (OUTPATIENT)
Dept: ORTHOPEDIC SURGERY | Facility: HOSPITAL | Age: 79
End: 2024-12-04
Payer: MEDICARE

## 2024-12-04 DIAGNOSIS — M25.551 RIGHT HIP PAIN: ICD-10-CM

## 2024-12-04 DIAGNOSIS — M16.11 PRIMARY OSTEOARTHRITIS OF RIGHT HIP: ICD-10-CM

## 2024-12-04 PROCEDURE — 99204 OFFICE O/P NEW MOD 45 MIN: CPT | Performed by: FAMILY MEDICINE

## 2024-12-04 PROCEDURE — 20611 DRAIN/INJ JOINT/BURSA W/US: CPT | Mod: RT | Performed by: FAMILY MEDICINE

## 2024-12-04 PROCEDURE — 2500000004 HC RX 250 GENERAL PHARMACY W/ HCPCS (ALT 636 FOR OP/ED): Performed by: FAMILY MEDICINE

## 2024-12-04 PROCEDURE — 1159F MED LIST DOCD IN RCRD: CPT | Performed by: FAMILY MEDICINE

## 2024-12-04 PROCEDURE — 99214 OFFICE O/P EST MOD 30 MIN: CPT | Mod: 25 | Performed by: FAMILY MEDICINE

## 2024-12-04 RX ORDER — ROPIVACAINE HYDROCHLORIDE 5 MG/ML
4 INJECTION, SOLUTION EPIDURAL; INFILTRATION; PERINEURAL
Status: COMPLETED | OUTPATIENT
Start: 2024-12-04 | End: 2024-12-04

## 2024-12-04 RX ORDER — METHYLPREDNISOLONE ACETATE 40 MG/ML
80 INJECTION, SUSPENSION INTRA-ARTICULAR; INTRALESIONAL; INTRAMUSCULAR; SOFT TISSUE
Status: COMPLETED | OUTPATIENT
Start: 2024-12-04 | End: 2024-12-04

## 2024-12-04 RX ORDER — LIDOCAINE HYDROCHLORIDE 10 MG/ML
7 INJECTION, SOLUTION INFILTRATION; PERINEURAL
Status: COMPLETED | OUTPATIENT
Start: 2024-12-04 | End: 2024-12-04

## 2024-12-04 NOTE — LETTER
"December 4, 2024     Santiago Hutchinson MD  [address] [facility], [address]  [facility] [address]    Patient: Estefany Ruano   YOB: 1945   Date of Visit: 12/4/2024       Dear Dr. Santiago Hutchinson MD:    Thank you for referring Estefany Ruano to me for evaluation. Below are my notes for this consultation.  If you have questions, please do not hesitate to call me. I look forward to following your patient along with you.       Sincerely,     Qamar Valdes MD      CC: Zaki Coleman MD  ______________________________________________________________________________________    Sports Medicine Office Note    Today's Date:  12/04/2024    Patient ID: Bell Ruano \"Estefany\" is a 79 y.o. female.    L Inj/Asp: R hip joint on 12/4/2024 1:29 PM  Indications: pain  Details: 20 G needle, ultrasound-guided anterior approach  Medications: 80 mg methylPREDNISolone acetate 40 mg/mL; 7 mL lidocaine 10 mg/mL (1 %); 4 mL ropivacaine 5 mg/mL (0.5 %)  Outcome: tolerated well, no immediate complications  Procedure, treatment alternatives, risks and benefits explained, specific risks discussed. Immediately prior to procedure a time out was called to verify the correct patient, procedure, equipment, support staff and site/side marked as required. Patient was prepped and draped in the usual sterile fashion.         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    Today, on 12/4/2024, Estefany reports about 7 months of right hip pain that she initially felt only in the groin but now feels in both her groin and posterior hip. This pain has gradually worsened and is exacerbated by walking, stairs, and driving. No numbness or tingling down the right leg. She has seen Dr. Coleman for this on 7/22/2024, who recommended physical therapy and meloxicam. She did not take any medication but has been doing PT with aqua therapy, which helps. " She also uses cold packs occasionally on her hip. She saw Dr. Coleman again on 11/7/2024, who discussed with her that she has right hip osteoarthritis and referred her for a hip injection, prompting her visit today.    She has no other complaints.    Physical Examination:     The Right hip and pelvis are without obvious signs of acute bony deformity or instability. Active and passive range of motion are limited in internal and external rotation and painful. There is tenderness to palpation over the right gluteal tendons and greater trochanter. Log roll is positive. Straight leg raise test is negative. RADHA and FADIR are positive for eliciting right groin and gluteal tendon pain. Crossover is negative. Hip strength is slightly weak as compared to the opposite hip. The opposite hip is otherwise nontender and stable. Gait is antalgic and tandem.    Imaging:  Radiographs of the right hip obtained 7/22/2024 were reviewed and revealed moderate osteoarthritis of the right hip.  The studies were reviewed by me and Dr. Valdes personally in the office today.    Formal radiology read:  === 07/22/24 ===  XR HIP RIGHT WITH PELVIS WHEN PERFORMED 2 OR 3 VIEWS  FINDINGS:  No acute fracture or malalignment.  Moderate to severe right hip osteoarthrosis with joint space loss and  osteophytes. Left hip joint space is maintained.  Lower lumbar spondylosis with disc height loss and osteophytes.  Soft tissues are within normal limits.  - Impression -  1. As above.  Signed by: Ksenia Zelaya 7/23/2024 9:07 PM    Procedure:  After consent was obtained, the RIGHT hip was prepped in a sterile fashion. Ultrasound guidance was used to help insure proper needle placement into the right hip joint, decrease patient discomfort, and decrease collateral damage. The joint was visualized and Depo-Medrol 80 mg with lidocaine 4 mL & ropivacaine 4 mL were injected without any complications. Ultrasound images were saved on an internal file for later  reference. The patient tolerated the procedure well and the area was cleaned and bandaged.    Procedure Note Attestation   Procedure performed by my sports medicine fellow.    I, Dr. Qamar Valdes MD, supervised the entire procedure .    Problem List Items Addressed This Visit             ICD-10-CM    Primary osteoarthritis of right hip M16.11    Relevant Orders    Point of Care Ultrasound (Completed)    L Inj/Asp: R hip joint    Right hip pain M25.551     Assessment and Plan:     We reviewed the exam and x-ray findings and discussed the conservative and surgical treatment options. We agreed to proceed with an ultrasound guided corticosteroid injection of the right hip joint for her right hip osteoarthritis, which she tolerated well. Activity modifications were reviewed. Follow-up in 5 weeks to see how she is doing in response to the injection.    **This note was dictated using Dragon speech recognition software and was not corrected for spelling or grammatical errors**.    Darvin Padgett MD, MEd  Primary Care Sports Medicine Fellow, PGY-4  [facility]      Attestation with edits by Qamar Valdes MD at 12/4/2024  5:59 PM:    Attending Note     Trainee role: Fellow    Trainee discussed patient with Dr. Valdes          I saw and evaluated the patient. I personally obtained the key and critical portions of the history and physical exam or was physically present for key and critical portions performed by the trainee. I reviewed the trainee's documentation and discussed the patient with the trainee. I agree with the trainee's medical decision making, as documented on the trainee's note.         Qamar Valdes MD  Sports Medicine Specialist  [facility] [place]

## 2024-12-04 NOTE — PROGRESS NOTES
"Sports Medicine Office Note    Today's Date:  12/04/2024    Patient ID: Bell Ruano \"Chanel" is a 79 y.o. female.    L Inj/Asp: R hip joint on 12/4/2024 1:29 PM  Indications: pain  Details: 20 G needle, ultrasound-guided anterior approach  Medications: 80 mg methylPREDNISolone acetate 40 mg/mL; 7 mL lidocaine 10 mg/mL (1 %); 4 mL ropivacaine 5 mg/mL (0.5 %)  Outcome: tolerated well, no immediate complications  Procedure, treatment alternatives, risks and benefits explained, specific risks discussed. Immediately prior to procedure a time out was called to verify the correct patient, procedure, equipment, support staff and site/side marked as required. Patient was prepped and draped in the usual sterile fashion.         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    Today, on 12/4/2024, Estefany reports about 7 months of right hip pain that she initially felt only in the groin but now feels in both her groin and posterior hip. This pain has gradually worsened and is exacerbated by walking, stairs, and driving. No numbness or tingling down the right leg. She has seen Dr. Coleman for this on 7/22/2024, who recommended physical therapy and meloxicam. She did not take any medication but has been doing PT with aqua therapy, which helps. She also uses cold packs occasionally on her hip. She saw Dr. Coleman again on 11/7/2024, who discussed with her that she has right hip osteoarthritis and referred her for a hip injection, prompting her visit today.    She has no other complaints.    Physical Examination:     The Right hip and pelvis are without obvious signs of acute bony deformity or instability. Active and passive range of motion are limited in internal and external rotation and painful. There is tenderness to palpation over the right gluteal tendons and greater trochanter. Log roll is positive. Straight leg raise test is negative. " RADHA and FADIR are positive for eliciting right groin and gluteal tendon pain. Crossover is negative. Hip strength is slightly weak as compared to the opposite hip. The opposite hip is otherwise nontender and stable. Gait is antalgic and tandem.    Imaging:  Radiographs of the right hip obtained 7/22/2024 were reviewed and revealed moderate osteoarthritis of the right hip.  The studies were reviewed by me and Dr. Valdes personally in the office today.    Formal radiology read:  === 07/22/24 ===  XR HIP RIGHT WITH PELVIS WHEN PERFORMED 2 OR 3 VIEWS  FINDINGS:  No acute fracture or malalignment.  Moderate to severe right hip osteoarthrosis with joint space loss and  osteophytes. Left hip joint space is maintained.  Lower lumbar spondylosis with disc height loss and osteophytes.  Soft tissues are within normal limits.  - Impression -  1. As above.  Signed by: Ksenia Zelaya 7/23/2024 9:07 PM    Procedure:  After consent was obtained, the RIGHT hip was prepped in a sterile fashion. Ultrasound guidance was used to help insure proper needle placement into the right hip joint, decrease patient discomfort, and decrease collateral damage. The joint was visualized and Depo-Medrol 80 mg with lidocaine 4 mL & ropivacaine 4 mL were injected without any complications. Ultrasound images were saved on an internal file for later reference. The patient tolerated the procedure well and the area was cleaned and bandaged.    Procedure Note Attestation   Procedure performed by my sports medicine fellow.    I, Dr. Qamar Valdes MD, supervised the entire procedure .    Problem List Items Addressed This Visit             ICD-10-CM    Primary osteoarthritis of right hip M16.11    Relevant Orders    Point of Care Ultrasound (Completed)    L Inj/Asp: R hip joint    Right hip pain M25.551     Assessment and Plan:     We reviewed the exam and x-ray findings and discussed the conservative and surgical treatment options. We agreed to proceed with  an ultrasound guided corticosteroid injection of the right hip joint for her right hip osteoarthritis, which she tolerated well. Activity modifications were reviewed. Follow-up in 5 weeks to see how she is doing in response to the injection.    **This note was dictated using Dragon speech recognition software and was not corrected for spelling or grammatical errors**.    Darvin Padgett MD, MEd  Primary Care Sports Medicine Fellow, PGY-4  [facility]

## 2025-01-08 ENCOUNTER — OFFICE VISIT (OUTPATIENT)
Dept: ORTHOPEDIC SURGERY | Facility: HOSPITAL | Age: 80
End: 2025-01-08
Payer: MEDICARE

## 2025-01-08 DIAGNOSIS — M16.11 PRIMARY OSTEOARTHRITIS OF RIGHT HIP: Primary | ICD-10-CM

## 2025-01-08 PROCEDURE — 99213 OFFICE O/P EST LOW 20 MIN: CPT | Performed by: FAMILY MEDICINE

## 2025-01-08 PROCEDURE — 1125F AMNT PAIN NOTED PAIN PRSNT: CPT | Performed by: FAMILY MEDICINE

## 2025-01-08 ASSESSMENT — PAIN DESCRIPTION - DESCRIPTORS: DESCRIPTORS: ACHING;DISCOMFORT;TENDER

## 2025-01-08 ASSESSMENT — PAIN - FUNCTIONAL ASSESSMENT: PAIN_FUNCTIONAL_ASSESSMENT: 0-10

## 2025-01-08 ASSESSMENT — PAIN SCALES - GENERAL: PAINLEVEL_OUTOF10: 3

## 2025-01-08 NOTE — PROGRESS NOTES
"Sports Medicine Office Note    Today's Date:  01/08/2025    Patient ID: Bell Ruano \"Chanel" is a 79 y.o. female.    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    On 12/4/2024, Estefany reports about 7 months of right hip pain that she initially felt only in the groin but now feels in both her groin and posterior hip. This pain has gradually worsened and is exacerbated by walking, stairs, and driving. No numbness or tingling down the right leg. She has seen Dr. Coleman for this on 7/22/2024, who recommended physical therapy and meloxicam. She did not take any medication but has been doing PT with aqua therapy, which helps. She also uses cold packs occasionally on her hip. She saw Dr. Coleman again on 11/7/2024, who discussed with her that she has right hip osteoarthritis and referred her for a hip injection, prompting her visit today.  We agreed to proceed with an ultrasound guided corticosteroid injection of the right hip joint for her right hip osteoarthritis, which she tolerated well. Activity modifications were reviewed. Follow-up in 5 weeks to see how she is doing in response to the injection.    Today, on 1/8/2025, Estefany returns for follow-up of right hip pain. She had 50% pain relief from her corticosteroid injection at her last visit. The pain in the right groin is much better, and she still has some pain in the lateral and posterior hip. She is able to walk and do the things that she wants to do now. She has only taken naproxen a couple of times in the past several weeks as needed. No new injuries. She expresses that she would like to pursue hip replacement at this time.    She has no other complaints.    Physical Examination:     The Right hip and pelvis are without obvious signs of acute bony deformity or instability. Active and passive range of motion are limited in internal and external rotation and minimally " painful. There is no tenderness to palpation to the greater trochanter. Log roll is negative Straight leg raise test is negative. RADHA and FADIR are positive for eliciting right groin pain. Crossover is negative. Hip strength is slightly weak as compared to the opposite hip. The opposite hip is otherwise nontender and stable. Gait is antalgic and tandem.    Imaging:  === 07/22/24 ===  XR HIP RIGHT WITH PELVIS WHEN PERFORMED 2 OR 3 VIEWS  FINDINGS:  No acute fracture or malalignment.  Moderate to severe right hip osteoarthrosis with joint space loss and  osteophytes. Left hip joint space is maintained.  Lower lumbar spondylosis with disc height loss and osteophytes.  Soft tissues are within normal limits.  - Impression -  1. As above.  Signed by: Ksenia Zelaya 7/23/2024 9:07 PM    Problem List Items Addressed This Visit             ICD-10-CM       Musculoskeletal and Injuries    Primary osteoarthritis of right hip - Primary M16.11     Assessment and Plan:     We reviewed the exam and x-ray findings and discussed the conservative and surgical treatment options. We agreed that she is doing well in response to the corticosteroid injection of the right hip joint for her right hip osteoarthritis. We reviewed activity modification and Tylenol and Voltaren, which she can use up to 3 times a day as needed. She should reach out to Dr. Coleman's office to coordinate and schedule a hip replacement. Follow-up as needed if her pain worsens and she would like a repeat injection, provided it has been at least 3 months since her last injection and she does not have scheduled hip replacement within 3 months of an anticipated repeat injection.    **This note was dictated using Dragon speech recognition software and was not corrected for spelling or grammatical errors**.    Darvin Padgett MD, MEd  Primary Care Sports Medicine Fellow, PGY-4  [facility]

## 2025-01-30 ENCOUNTER — OFFICE VISIT (OUTPATIENT)
Dept: ORTHOPEDIC SURGERY | Facility: CLINIC | Age: 80
End: 2025-01-30
Payer: MEDICARE

## 2025-01-30 VITALS — BODY MASS INDEX: 23.79 KG/M2 | WEIGHT: 151.6 LBS | HEIGHT: 67 IN

## 2025-01-30 DIAGNOSIS — M16.11 PRIMARY OSTEOARTHRITIS OF RIGHT HIP: Primary | ICD-10-CM

## 2025-01-30 PROCEDURE — 1159F MED LIST DOCD IN RCRD: CPT | Performed by: ORTHOPAEDIC SURGERY

## 2025-01-30 PROCEDURE — 99214 OFFICE O/P EST MOD 30 MIN: CPT | Performed by: ORTHOPAEDIC SURGERY

## 2025-01-30 PROCEDURE — 1036F TOBACCO NON-USER: CPT | Performed by: ORTHOPAEDIC SURGERY

## 2025-01-30 RX ORDER — CEFAZOLIN SODIUM 2 G/100ML
2 INJECTION, SOLUTION INTRAVENOUS ONCE
OUTPATIENT
Start: 2025-01-30 | End: 2025-01-30

## 2025-01-30 RX ORDER — ACETAMINOPHEN 325 MG/1
975 TABLET ORAL ONCE
OUTPATIENT
Start: 2025-01-30 | End: 2025-01-30

## 2025-01-30 RX ORDER — PREGABALIN 25 MG/1
75 CAPSULE ORAL ONCE
OUTPATIENT
Start: 2025-01-30 | End: 2025-01-30

## 2025-01-30 RX ORDER — MELOXICAM 7.5 MG/1
7.5 TABLET ORAL ONCE
OUTPATIENT
Start: 2025-01-30 | End: 2025-01-30

## 2025-01-30 NOTE — PROGRESS NOTES
Returns for right hip.  Got good relief from hip injection for about 4 weeks.  She did not go and get another one because of the delay for surgery.  She has severe pain across the groin.  Difficulty with stair climbing.  Would like to go ahead with hip replacement.    Exam: Unchanged.    Assessment: End-stage arthrosis right hip.    Plan: Discussed nonoperative and operative options in detail.   Risk and benefits discussed in detail. All questions answered today.  Recovery timeline and expectations discussed in detail.  Discussed hip replacement.  Discussed postop expectations.  Would likely set up sometime in March.  Right total Hip Replacement 15710 with Depuy Actis implant. Anesthesia consult. Antibiotics 2 gm Kefzol. 1 gm TXA. 75 minutes   Crutches or walker  POD 0

## 2025-02-04 ENCOUNTER — HOSPITAL ENCOUNTER (OUTPATIENT)
Dept: RADIOLOGY | Facility: CLINIC | Age: 80
Discharge: HOME | End: 2025-02-04
Payer: MEDICARE

## 2025-02-04 VITALS — WEIGHT: 151.68 LBS | HEIGHT: 67 IN | BODY MASS INDEX: 23.81 KG/M2

## 2025-02-04 DIAGNOSIS — Z12.31 SCREENING MAMMOGRAM FOR BREAST CANCER: ICD-10-CM

## 2025-02-04 PROCEDURE — 77063 BREAST TOMOSYNTHESIS BI: CPT | Performed by: RADIOLOGY

## 2025-02-04 PROCEDURE — 77067 SCR MAMMO BI INCL CAD: CPT

## 2025-02-04 PROCEDURE — 77067 SCR MAMMO BI INCL CAD: CPT | Performed by: RADIOLOGY

## 2025-02-27 ENCOUNTER — EDUCATION (OUTPATIENT)
Dept: ORTHOPEDIC SURGERY | Facility: CLINIC | Age: 80
End: 2025-02-27
Payer: MEDICARE

## 2025-02-27 NOTE — GROUP NOTE
In addition to the group class activities, Estefany Ruano had the following lab work completed:  No orders of the defined types were placed in this encounter.    Bell Ruano  attended joint class on 2/27 and Did not bring a care partner to the class. The preop survey was completed and the patient provided attestation that they understand the content reviewed and that they do have a care partner identified to assist with recovery. Patient was provided with a folder of materials and instructed to call orthopedic navigator with questions.   A new History and Physical was not completed.    Thank you for attending our Joint Replacement class today in preparation for your upcoming surgery.  Topics discussed include:    MyChart Enrollment  Communication with Care Team  My Chart is the best form of communication to reach all of your caregivers  You can send messages to specific care givers, or a care team  Continued Education  You will be enrolled in a Total Joint Replacement care plan to receive additional education before and after surgery  You can review a short recording of the class content  Access to Medical Records  You can access test results, office notes, appointments, etc.  You can connect to other healthcare systems who use [employer] ([facility], formerly [facility], [facility], [facility], etc.)  [id number]  Program Information  Using Meds to Beds is our standard process for joint replacements at [facility] to minimize issues with homegoing medications. Please let us know ahead of time if there is a reason why Meds to Beds cannot be used    Background/Understanding of Joint Replacement Surgery  Potential for same day discharge  Any questions or concerns about your specific surgery/plan are to be directed to the surgeon's office    How to Prepare for Surgery  Use of Nicotine Products/Smoking  Stop several weeks before surgery  Such products slow down the healing process and increase risk of post-op infection and complications  Clearance for  Surgery  Medical Clearance by Specialists  Dental Clearance  Cracked/Broken/Loose teeth left untreated may postpone surgery  The importance of post-op antibiotics for dental visits per surgeon protocol  Preadmission Testing  **Potential for postponed surgery if appropriate clearance is not obtained  Medication Instruction  Follow instructions provided by the doctor who prescribes your medication (typically, but not limited to cardiologist)  Preadmission testing will provide additional instructions during your appointment on what to stop and what to take as you get closer to surgery  For clarification of these instructions, please call preadmission testing directly - [phone]  Tips for Preparing the home for discharge from the hospital  Care Partner  Requirement for surgery, the patient must have a plan to have help at home  Potential for postponed surgery if plan for home support cannot be established  How the care partner can help after surgery  CHG Body Wash/Mouth Wash  Follow the instructions given at preadmission testing  Body wash is to be used on the body and hair for 5 washes  Mouthwash is to be used the night before and morning of surgery  **This is a system-wide protocol developed by infectious disease professionals, we will not alter our recommendations for those with sensitive skin or those who have special hair needs.  Please follow the instructions as they are written as this will provide the best infection prevention measures for surgery.  Should you have an allergy to one of the products, please discuss with your preadmission team**    What to Expect in the Hospital/At Home  Morning of Surgery NPO Guidelines  Nothing to eat after midnight  Water can be consumed up to 2 hours prior to arrival  Surgical and Post-Surgical Care Team  Surgical Team  Anesthesia Team  Nursing  Physical Therapy  Care Coordinating  Pharmacy  Hospital Arrival Instructions  Arrive at the time provided to you  Consider  traffic patterns (rush-hour) based on arrival time  Have arrangements made for a ride home  If discharging same day, care partner should remain at the hospital  Recovering after Surgery  Recovery Room - Visitors are not brought back  Transition to hospital room - 2nd Floor, Visitors will be directed to your room  The presence of and strategies for controlling surgical pain and swelling  The importance of early mobility  Side effects after surgery  What to expect if staying overnight    Discharge Planning  The intended plan for discharge will be for patients to discharge home  All patients require a care partner (family, friend, neighbor, etc.) to stay with the patient for the first few nights after surgery  The inability to secure help at home will postpone surgery  Home Care Services set up per surgeon order  Physical Therapy  Occupational Therapy  **If desired, private duty care can be arranged by the patient ahead of time**  Outpatient Physical Therapy per surgeon order    Recovering at Home  Wound Care  Follow wound care instructions found in your discharge paperwork  Bandage is water-resistant and you may shower with the bandage  Do not scrub directly over the bandage  Do not submerge in water until cleared (bathtub, hot tub, pool, etc.)    Post-Op Risk Prevention  Infection Prevention  Promptly seek treatment for any infections post-operatively  Routine dental visits must be postponed for 3 months after surgery  Your surgeon may require antibiotics prior to future dental visits  Any concerns for infection not related directly to the knee or the hip should be managed by your primary care provider  Blood Clots  Be sure to complete the course of blood thinning medication as prescribed by your surgeon  Movement every 1-2 hours during the day is encouraged to prevent blood clots  Monitor for signs of blood clots  Wear compression stockings as prescribed by your surgeon  Constipation  Constipation is common  following surgery  Drink plenty of fluids  Take stool softener/laxative as prescribed by your surgeon  Move around frequently  Eat foods high in fiber  Fall Prevention  Prepare home ahead of time to clear space to move with walker  Remove throw rugs and electrical cords from walkways  Install railings near any stairways with more than 2 steps  Use night lights for increased visibility at night  Continue to use your assistive device until cleared by surgeon or physical therapy  Dislocation Prevention - Not all procedures will have dislocation precautions  Follow dislocation precautions provided by your surgeon  It is OK to resume sexual activity about 6 weeks following surgery  Be sure to follow any dislocation precautions assigned    Durable Medical Equipment  Cold Therapy  Breg Cold Therapy Machines  Ice/Gel Packs  Assistive Devices  Folding Walker with Wheels (in the front only)  No Rollators  Crutches if approved by Physical Therapy and Surgeon after surgery  Hip Kits  Raised Toilet Seats  Additional Compression Stockings    Joint Preservation  Healthy Activities when Cleared  Walking  Swimming  Bike Riding  Activities to Avoid  Refrain from repetitive motions which have a high impact on the joint  Gradual Progression  Progress activity slowly, listen to your body  Common Findings - NORMAL after surgery  Clicking/Grinding  Numbness near incision    Physical Therapy  Prehabilitation exercises  START TODAY ON BOTH LEGS  Surgery Specific Precautions  Follow surgery specific precautions found in your discharge paperwork    Follow-Up Visit  All patients will see their surgeon for a follow up visit after surgery  The visit may range from 2-6 weeks after surgery and is surgeon specific      Please don't hesitate to reach out if you have any additional questions or concerns.    Glen Zeng BSN, RN  Vannessa VALENTINN, RN-BC  Orthopedic Nurses  [facility]   [phone]

## 2025-03-10 ENCOUNTER — CLINICAL SUPPORT (OUTPATIENT)
Dept: PREADMISSION TESTING | Facility: HOSPITAL | Age: 80
End: 2025-03-10
Payer: MEDICARE

## 2025-03-10 DIAGNOSIS — M16.11 PRIMARY OSTEOARTHRITIS OF RIGHT HIP: ICD-10-CM

## 2025-03-16 NOTE — CPM/PAT NURSE NOTE
"CPM/PAT Nurse Note      Name: Bell Ruano (Bell Ruano \"Estefany\")  /Age: 1945/79 y.o.       Past Medical History:   Diagnosis Date    Arthritis     Breast cancer (Multi)     Fracture of ankle     Hyperlipidemia     Hypertension     Hypothyroidism     Osteoporosis     Personal history of irradiation     Prediabetes     3/18/24 A1C 5.9%    Primary osteoarthritis of right hip     Plan: Right Hip Total Arthroplasty ~ Posterior Approach 3/25/25    Vitamin D deficiency        Past Surgical History:   Procedure Laterality Date    ANKLE FRACTURE SURGERY      BREAST BIOPSY      BREAST LUMPECTOMY Right     PLEURA BIOPSY      TUBAL LIGATION         Patient  reports that she is not currently sexually active and has had partner(s) who are male. She reports using the following method of birth control/protection: None.    Family History   Problem Relation Name Age of Onset    Valvular heart disease Mother      Pancreatic cancer Father Сергей Rand     Cancer Father Сергей Rand     Arthritis Brother Danny Rand     Other (diabetes mellitus) Brother Danny Rand     Diabetes Brother Danny Rand        No Known Allergies    Prior to Admission medications    Medication Sig Start Date End Date Taking? Authorizing Provider   atorvastatin (Lipitor) 10 mg tablet Take 1 tablet (10 mg) by mouth once daily. 10/24/23   Santiago Hutchinson MD   calcium carbonate (Tums) 200 mg calcium chewable tablet Chew 2 tablets (1,000 mg) once daily. Patients choice. 16   Historical Provider, MD   ergocalciferol, vitamin D2, 50 mcg (2,000 unit) tablet Take 50 mcg by mouth once daily. 16   Historical Provider, MD   fluticasone (Cutivate) 0.05 % cream Apply topically 2 times a day. 3/16/23   Santiago Hutchinson MD   latanoprost (Xalatan) 0.005 % ophthalmic solution Administer into affected eye(s). 21   Historical Provider, MD   levothyroxine (Synthroid, Levoxyl) 75 mcg tablet Take 1 tablet (75 mcg) by mouth once daily. 10/18/24  "  Santiago Hutchinson MD   triamterene-hydrochlorothiazid (Dyazide) 37.5-25 mg capsule Take 1 capsule by mouth once daily. 5/7/24   Santiago Hutchinson MD   ascorbic acid, vitamin C, 500 mg capsule Take by mouth. 1/14/20 3/10/25  Historical Provider, MD   biotin 1 mg tablet Take by mouth. 1/19/21 3/10/25  Historical Provider, MD DONTAE HARRELL     DASSHELIA Risk Score    No data to display       Caprini DVT Assessment    No data to display       Modified Frailty Index    No data to display       [id number] Stroke Risk Points  Current as of just now        N/A 0 to 9 Points:      Last Change: N/A          The TUH8MW8-[id number] risk score (Lip GH, et al. 2009. © 2010 American College [employer]) quantifies the risk of stroke for a patient with atrial fibrillation. For patients without atrial fibrillation or under the age of 18 this score appears as N/A. Higher score values generally indicate higher risk of stroke.        This score is not applicable to this patient. Components are not calculated.          Revised Cardiac Risk Index    No data to display       Apfel Simplified Score    No data to display       Risk Analysis Index Results This Encounter    No data found in the last 10 encounters.       Prodigy: High Risk  Total Score: 12              Prodigy Age Score           ARISCAT Score for Postoperative Pulmonary Complications    No data to display       Laboy Perioperative Risk for Myocardial Infarction or Cardiac Arrest (SANIYA)    No data to display         Nurse Plan of Action: RN screening call complete.  Reviewed allergies, medications and pharmacy, medical, surgical and social history with patient.  Chart updated.

## 2025-03-18 ENCOUNTER — TELEPHONE (OUTPATIENT)
Dept: ORTHOPEDIC SURGERY | Facility: HOSPITAL | Age: 80
End: 2025-03-18

## 2025-03-18 ENCOUNTER — LAB (OUTPATIENT)
Dept: LAB | Facility: HOSPITAL | Age: 80
End: 2025-03-18
Payer: MEDICARE

## 2025-03-18 ENCOUNTER — HOSPITAL ENCOUNTER (OUTPATIENT)
Dept: RADIOLOGY | Facility: HOSPITAL | Age: 80
Discharge: HOME | End: 2025-03-18
Payer: MEDICARE

## 2025-03-18 ENCOUNTER — PRE-ADMISSION TESTING (OUTPATIENT)
Dept: PREADMISSION TESTING | Facility: HOSPITAL | Age: 80
End: 2025-03-18
Payer: MEDICARE

## 2025-03-18 VITALS
HEART RATE: 85 BPM | HEIGHT: 67 IN | OXYGEN SATURATION: 97 % | RESPIRATION RATE: 16 BRPM | TEMPERATURE: 98.3 F | WEIGHT: 150.35 LBS | SYSTOLIC BLOOD PRESSURE: 150 MMHG | BODY MASS INDEX: 23.6 KG/M2 | DIASTOLIC BLOOD PRESSURE: 82 MMHG

## 2025-03-18 DIAGNOSIS — Z01.818 ENCOUNTER FOR OTHER PREPROCEDURAL EXAMINATION: Primary | ICD-10-CM

## 2025-03-18 DIAGNOSIS — Z01.818 PREOP TESTING: Primary | ICD-10-CM

## 2025-03-18 DIAGNOSIS — R73.9 ELEVATED BLOOD SUGAR: ICD-10-CM

## 2025-03-18 DIAGNOSIS — M16.11 PRIMARY OSTEOARTHRITIS OF RIGHT HIP: ICD-10-CM

## 2025-03-18 DIAGNOSIS — R73.9 HYPERGLYCEMIA, UNSPECIFIED: ICD-10-CM

## 2025-03-18 LAB
ANION GAP SERPL CALC-SCNC: 14 MMOL/L (ref 10–20)
BASOPHILS # BLD AUTO: 0.04 X10*3/UL (ref 0–0.1)
BASOPHILS NFR BLD AUTO: 0.6 %
BUN SERPL-MCNC: 22 MG/DL (ref 6–23)
CALCIUM SERPL-MCNC: 9.6 MG/DL (ref 8.6–10.3)
CHLORIDE SERPL-SCNC: 101 MMOL/L (ref 98–107)
CO2 SERPL-SCNC: 30 MMOL/L (ref 21–32)
CREAT SERPL-MCNC: 0.94 MG/DL (ref 0.5–1.05)
EGFRCR SERPLBLD CKD-EPI 2021: 63 ML/MIN/1.73M*2
EOSINOPHIL # BLD AUTO: 0.07 X10*3/UL (ref 0–0.4)
EOSINOPHIL NFR BLD AUTO: 1 %
ERYTHROCYTE [DISTWIDTH] IN BLOOD BY AUTOMATED COUNT: 14 % (ref 11.5–14.5)
EST. AVERAGE GLUCOSE BLD GHB EST-MCNC: 123 MG/DL
GLUCOSE SERPL-MCNC: 91 MG/DL (ref 74–99)
HBA1C MFR BLD: 5.9 %
HCT VFR BLD AUTO: 42.6 % (ref 36–46)
HGB BLD-MCNC: 13.7 G/DL (ref 12–16)
IMM GRANULOCYTES # BLD AUTO: 0.03 X10*3/UL (ref 0–0.5)
IMM GRANULOCYTES NFR BLD AUTO: 0.4 % (ref 0–0.9)
LYMPHOCYTES # BLD AUTO: 2.02 X10*3/UL (ref 0.8–3)
LYMPHOCYTES NFR BLD AUTO: 27.9 %
MCH RBC QN AUTO: 27.5 PG (ref 26–34)
MCHC RBC AUTO-ENTMCNC: 32.2 G/DL (ref 32–36)
MCV RBC AUTO: 85 FL (ref 80–100)
MONOCYTES # BLD AUTO: 0.66 X10*3/UL (ref 0.05–0.8)
MONOCYTES NFR BLD AUTO: 9.1 %
NEUTROPHILS # BLD AUTO: 4.41 X10*3/UL (ref 1.6–5.5)
NEUTROPHILS NFR BLD AUTO: 61 %
NRBC BLD-RTO: 0 /100 WBCS (ref 0–0)
PLATELET # BLD AUTO: 227 X10*3/UL (ref 150–450)
POTASSIUM SERPL-SCNC: 4.6 MMOL/L (ref 3.5–5.3)
RBC # BLD AUTO: 4.99 X10*6/UL (ref 4–5.2)
SODIUM SERPL-SCNC: 140 MMOL/L (ref 136–145)
WBC # BLD AUTO: 7.2 X10*3/UL (ref 4.4–11.3)

## 2025-03-18 PROCEDURE — 83036 HEMOGLOBIN GLYCOSYLATED A1C: CPT

## 2025-03-18 PROCEDURE — 93005 ELECTROCARDIOGRAM TRACING: CPT | Performed by: PHYSICIAN ASSISTANT

## 2025-03-18 PROCEDURE — 80048 BASIC METABOLIC PNL TOTAL CA: CPT

## 2025-03-18 PROCEDURE — 87081 CULTURE SCREEN ONLY: CPT | Mod: AHULAB | Performed by: PHYSICIAN ASSISTANT

## 2025-03-18 PROCEDURE — 73502 X-RAY EXAM HIP UNI 2-3 VIEWS: CPT | Mod: RT

## 2025-03-18 PROCEDURE — 73502 X-RAY EXAM HIP UNI 2-3 VIEWS: CPT | Mod: RIGHT SIDE | Performed by: RADIOLOGY

## 2025-03-18 PROCEDURE — 85025 COMPLETE CBC W/AUTO DIFF WBC: CPT

## 2025-03-18 PROCEDURE — 99204 OFFICE O/P NEW MOD 45 MIN: CPT | Performed by: PHYSICIAN ASSISTANT

## 2025-03-18 PROCEDURE — 93010 ELECTROCARDIOGRAM REPORT: CPT | Performed by: INTERNAL MEDICINE

## 2025-03-18 RX ORDER — CHLORHEXIDINE GLUCONATE ORAL RINSE 1.2 MG/ML
SOLUTION DENTAL
Qty: 473 ML | Refills: 0 | Status: SHIPPED | OUTPATIENT
Start: 2025-03-18

## 2025-03-18 RX ORDER — CHOLECALCIFEROL (VITAMIN D3) 25 MCG
1000 TABLET ORAL DAILY
COMMUNITY

## 2025-03-18 ASSESSMENT — ENCOUNTER SYMPTOMS
GASTROINTESTINAL NEGATIVE: 1
ENDOCRINE NEGATIVE: 1
EYES NEGATIVE: 1
CARDIOVASCULAR NEGATIVE: 1
HEMATOLOGIC/LYMPHATIC NEGATIVE: 1
RESPIRATORY NEGATIVE: 1
NEUROLOGICAL NEGATIVE: 1
MUSCULOSKELETAL NEGATIVE: 1
CONSTITUTIONAL NEGATIVE: 1
PSYCHIATRIC NEGATIVE: 1
ALLERGIC/IMMUNOLOGIC NEGATIVE: 1

## 2025-03-18 NOTE — H&P (VIEW-ONLY)
"[facility]/PAT Evaluation       Name: Bell Ruano (Bell Ruano \"Estefany\")  /Age: 1945/79 y.o.     In-Person       Chief Complaint: Primary osteoarthritis of right hip [M16.11]     HPI      Date of Consult: 3/18/25    Referring Provider: Dr. Coleman    Surgery, Date, and Length: Right Hip Total Arthroplasty ~ Posterior Approach ; 3/25/25; 150 minutes     Bell Ruano  is a 79 year-old female who presents to the [facility] for perioperative risk assessment prior to surgery. Patient presented with right hip pain past several months. Symptoms made worse with activity such as walking, exercise. Patient claims her pain is limiting her ability to walk her dog or exercise which she typically enjoys. Has done therapy and feels strong but still having significant groin pain.   Got good relief from hip injection for about 4 weeks. Would like to go ahead with hip replacement.       This note was created in part upon personal review of patient's medical records.      Patient is scheduled to have Right Hip Total Arthroplasty ~ Posterior Approach     Medical History  Past Medical History:   Diagnosis Date    Arthritis     Breast cancer (Multi)     right s/p surgery and radiation    Glaucoma     pressure stable    Hyperlipidemia     Hypertension     Hypothyroidism     Osteoporosis     Personal history of irradiation     Prediabetes     3/18/24 A1C 5.9%    Primary osteoarthritis of right hip     Plan: Right Hip Total Arthroplasty ~ Posterior Approach 3/25/25    Vitamin D deficiency             Surgical History  Past Surgical History:   Procedure Laterality Date    ANKLE FRACTURE SURGERY Right     ORIF    BREAST BIOPSY      BREAST LUMPECTOMY Right     PLEURA BIOPSY      benign    TUBAL LIGATION               Family history:  Family History   Problem Relation Name Age of Onset    Valvular heart disease Mother      Pancreatic cancer Father Сергей Rand     Cancer Father Сергей Rand     Arthritis Brother Bill " "[place]ona     Other (diabetes mellitus) Brother Danny Rand     Diabetes Brother Danny Rand         Social history:  Social History     Socioeconomic History    Marital status:      Spouse name: Not on file    Number of children: Not on file    Years of education: Not on file    Highest education level: Not on file   Occupational History    Not on file   Tobacco Use    Smoking status: Former     Current packs/day: 0.00     Average packs/day: 1 pack/day for 20.0 years (20.0 ttl pk-yrs)     Types: Cigarettes     Start date: 1970     Quit date: 1990     Years since quittin.2    Smokeless tobacco: Never   Vaping Use    Vaping status: Never Used   Substance and Sexual Activity    Alcohol use: Yes     Alcohol/week: 14.0 standard drinks of alcohol     Types: 14 Standard drinks or equivalent per week    Drug use: Never    Sexual activity: Not Currently     Partners: Male     Birth control/protection: None   Other Topics Concern    Not on file   Social History Narrative    Not on file     Social Drivers of Health     Financial Resource Strain: Not on file   Food Insecurity: Not on file   Transportation Needs: Not on file   Physical Activity: Not on file   Stress: Not on file   Social Connections: Not on file   Intimate Partner Violence: Not on file   Housing Stability: Not on file        Current Outpatient Medications   Medication Instructions    atorvastatin (LIPITOR) 10 mg, oral, Daily    chlorhexidine (Peridex) 0.12 % solution 15 ml swish and spit for 30 seconds night prior to surgery and morning of surgery    cholecalciferol (VITAMIN D3) 1,000 Units, Daily    latanoprost (Xalatan) 0.005 % ophthalmic solution Administer into affected eye(s).    levothyroxine (SYNTHROID, LEVOXYL) 75 mcg, oral, Daily    triamterene-hydrochlorothiazid (Dyazide) 37.5-25 mg capsule 1 capsule, oral, Daily       Visit Vitals  /82   Pulse 85   Temp 36.8 °C (98.3 °F)   Resp 16   Ht 1.702 m (5' 7\")   Wt 68.2 kg (150 lb " 5.7 oz)   SpO2 97%   BMI 23.55 kg/m²   OB Status Postmenopausal   Smoking Status Former   BSA 1.8 m²               Review of Systems   Constitutional: Negative.    HENT: Negative.     Eyes: Negative.         Glasses   Respiratory: Negative.     Cardiovascular: Negative.         METS 4  +stairs / walk / house work Without chest pain / SOB    Gastrointestinal: Negative.    Endocrine: Negative.    Genitourinary: Negative.    Musculoskeletal: Negative.         Right hip pain   Skin: Negative.    Allergic/Immunologic: Negative.    Neurological: Negative.    Hematological: Negative.    Psychiatric/Behavioral: Negative.          Physical Exam  Vitals reviewed.   Constitutional:       Appearance: Normal appearance.   HENT:      Head: Normocephalic and atraumatic.      Right Ear: External ear normal.      Left Ear: External ear normal.      Nose: Nose normal.      Mouth/Throat:      Pharynx: Oropharynx is clear.   Eyes:      Extraocular Movements: Extraocular movements intact.      Conjunctiva/sclera: Conjunctivae normal.      Pupils: Pupils are equal, round, and reactive to light.   Cardiovascular:      Rate and Rhythm: Normal rate and regular rhythm.      Heart sounds: Normal heart sounds.   Pulmonary:      Effort: Pulmonary effort is normal.      Breath sounds: Normal breath sounds.   Abdominal:      Palpations: Abdomen is soft.   Musculoskeletal:         General: Normal range of motion.      Cervical back: Normal range of motion and neck supple.   Skin:     General: Skin is warm and dry.   Neurological:      General: No focal deficit present.      Mental Status: She is alert and oriented to person, place, and time.   Psychiatric:         Mood and Affect: Mood normal.         Behavior: Behavior normal.          PAT AIRWAY:   Airway:     Mallampati::  II    Neck ROM::  Full    Lab Results   Component Value Date    WBC 7.2 03/18/2025    HGB 13.7 03/18/2025    HCT 42.6 03/18/2025    MCV 85 03/18/2025     03/18/2025       Lab Results   Component Value Date    GLUCOSE 91 03/18/2025    CALCIUM 9.6 03/18/2025     03/18/2025    K 4.6 03/18/2025    CO2 30 03/18/2025     03/18/2025    BUN 22 03/18/2025    CREATININE 0.94 03/18/2025      Lab Results   Component Value Date    HGBA1C 5.9 (H) 03/18/2025      EKG 3/18/25  NSR  Left axis deviation  68 BPM     Patient is scheduled to have Right Hip Total Arthroplasty ~ Posterior Approach     Cardiovascular  METS: 4   RCRI: 0  , 3.9 % Risk of MACE  Caprini: 8    HTN- triamterene- hydrochlorothiazide Continue DOS   HLD- atorvastatin Continue DOS     Pulmonary  Stop Bang score is 2 placing patient at low risk for YOANDY  ARISCAT: <26 points, 1.6% risk of in-hospital postoperative pulmonary complication  PRODIGY: High risk for opioid induced respiratory depression      Neurology  No neurologic diagnosis, however, the patient is at increased risk for perioperative delirium secondary to  age, type and duration of surgery, Patient instructed on and provided cognitive exercises  Patient is at increased risk for perioperative CVA secondary to  HTN, increased age, operative time > 2.5 hours       Endocrinology  Hypothyroid - levothyroxine Continue DOS     Hematology       Patient instructed to ambulate as soon as possible postoperatively to decrease thromboembolic risk.      Initiate mechanical DVT prophylaxis as soon as possible and initiate chemical prophylaxis when deemed safe from a bleeding standpoint post surgery.       VTE prophylaxis per surgical team         Tests ordered in PAT: cbc, bmp, A1c, mrsa, EKG   LABS REVIEWED: unremarkable     Risk assessment complete.  Patient is scheduled for a intermediate surgical risk procedure.        Preoperative medication instructions were provided and reviewed with the patient.  Any additional testing or evaluation was explained to the patient.  Nothing by mouth instructions were discussed and patient's questions were answered prior to conclusion to  this encounter.  Patient verbalized understanding of preoperative instructions given in preadmission testing; discharge instructions available in EMR.

## 2025-03-18 NOTE — PREPROCEDURE INSTRUCTIONS
Medication List            Accurate as of March 18, 2025  1:17 PM. Always use your most recent med list.                atorvastatin 10 mg tablet  Commonly known as: Lipitor  Take 1 tablet (10 mg) by mouth once daily.  Medication Adjustments for Surgery: Take/Use as prescribed     latanoprost 0.005 % ophthalmic solution  Commonly known as: Xalatan  Medication Adjustments for Surgery: Take/Use as prescribed     levothyroxine 75 mcg tablet  Commonly known as: Synthroid, Levoxyl  Take 1 tablet (75 mcg) by mouth once daily.  Medication Adjustments for Surgery: Take on the morning of surgery     triamterene-hydrochlorothiazid 37.5-25 mg capsule  Commonly known as: Dyazide  Take 1 capsule by mouth once daily.  Medication Adjustments for Surgery: Take on the morning of surgery     Vitamin D3 25 mcg (1000 units) tablet  Generic drug: cholecalciferol  Medication Adjustments for Surgery: Do Not take on the morning of surgery                     Preoperative Brain Exercises    What are brain exercises?  A brain exercise is any activity that engages your thinking (cognitive) skills.    What types of activities are considered brain exercises?  Jigsaw puzzles, crossword puzzles, word jumble, memory games, word search, and many more.  Many can be found free online or on your phone via a mobile yvette.    Why should I do brain exercises before my surgery?  More recent research has shown brain exercise before surgery can lower the risk of postoperative delirium (confusion) which can be especially important for older adults.  Patients who did brain exercises for 5 to 10 hours (total) for the 7-10 days before surgery, cut their risk of postoperative delirium in half up to 1 week after surgery.     Concerning above medication instructions - If medication is normally taken at night continue normal schedule - do not take night prior and morning of surgery.       Preoperative Deep Breathing Exercises  Why it is important to do deep  breathing exercises before my surgery?  Deep breathing exercises strengthen your breathing muscles.  This helps you to recover after your surgery and decreases the chance of breathing complications.  How are the deep breathing exercises done?  Sit straight with your back supported.  Breathe in deeply and slowly through your nose. Your lower rib cage should expand and your abdomen may move forward.  Hold that breath for 3 to 5 seconds.  Breathe out through pursed lips, slowly and completely.  Rest and repeat 10 times every hour while awake.  Rest longer if you become dizzy or lightheaded.      CONTACT SURGEON'S OFFICE IF YOU DEVELOP:  * Fever = 100.4 F   * New respiratory symptoms (e.g. cough, shortness of breath, respiratory distress, sore throat)  * Recent loss of taste or smell  *Flu like symptoms such as headache, fatigue or gastrointestinal symptoms  * You develop any open sores, shingles, burning or painful urination   AND/OR:  * You no longer wish to have the surgery.  * Any other personal circumstances change that may lead to the need to cancel or defer this surgery.  *You were admitted to any hospital within one week of your planned procedure.    SMOKING:  *Quitting smoking can make a huge difference to your health and recovery from surgery.    *If you need help with quitting, call 0-348-QUIT-NOW.    THE DAY OF SURGERY:  *Do not eat any food after midnight the night before surgery/procedure.   *YOU MUST DRINK 14 oz drink clear liquids (i.e. water, black coffee/tea, (no milk or cream) apple juice, and electrolyte drinks (Gatorade)  2 hours before your instructed arrival time to the hospital.  *You may chew gum until  2 hours before your surgery/procedure.    SURGICAL TIME  *You will be contacted between 2 p.m. and 6 p.m. the business day before your surgery with your arrival time.  *If you haven't received a call by 6pm, call [phone].  *Scheduled surgery times may change and you will be notified if this  occurs-check your personal voicemail for any updates.    ON THE MORNING OF SURGERY:  *Wear comfortable, loose fitting clothing.   *Do not use moisturizers, creams, lotions or perfume.  *All jewelry and valuables should be left at home.  *Prosthetic devices such as contact lenses, hearing aids, dentures, eyelash extensions, hairpins and body piercing must be removed before surgery.    BRING WITH YOU:  *Photo ID and insurance card  *Current list of medicines and allergies  *Pacemaker/Defibrillator/Heart stent cards  *CPAP machine and mask  *Slings/splints/crutches  *Copy of your complete Advanced Directive/DHPOA-if applicable  *Neurostimulator implant remote    PARKING AND ARRIVAL:  *Check in at the Main Entrance desk and let them know you are here for surgery.  *You will be directed to the 2nd floor surgical waiting area.    AFTER OUTPATIENT SURGERY:  *A responsible adult MUST accompany you at the time of discharge and stay with you for 24 hours after your surgery.  *You may NOT drive yourself home after surgery.  *You may use a taxi or ride sharing service ([employer], [employer]) to return home ONLY if you are accompanied by a friend or family member.  *Instructions for resuming your medications will be provided by your surgeon.      Home Preoperative Antibacterial Shower     What is a home preoperative antibacterial shower?  This shower is a way of cleaning the skin with a germ killing soap before surgery.  The soap contains chlorhexidine, commonly known as CHG.  CHG is a soap for your skin with germ killing ability.  Let your doctor know if you are allergic to chlorhexidine.    Why do I need to take a preoperative antibacterial shower?  Skin is not sterile.  It is best to try to make your skin as free of germs as possible before surgery.  Proper cleansing with a germ killing soap before surgery can lower the number of germs on your skin.  This helps to reduce the risk of infection at the surgical site.  Following the  instructions listed below will help you prepare your skin for surgery.      How do I use the CHG skin cleanser?  Steps:  Begin using your CHG soap five days before your scheduled surgery on ________________________.    Days 1-4 Shower before bed:  Wash your face and genitals with your normal soap and rinse.           2.    Apply the CHG soap to a clean wet washcloth.  Turn the water off or move away                From the water spray to avoid premature rinsing of the CHG soap as you are applying.     3.   Lather your entire body from the neck down.  Do not use on your face or genitals.  4. Pay special attention to the area(s) where your incision(s) will be located unless they are on your face.  Avoid scrubbing your skin too hard.  The important point is to have the CHG soap sit on your skin for 3 minutes.    When the 3 minutes are up, turn on the water and rinse the CHG soap off your body completely.   Pat yourself dry with a clean, freshly-laundered towel.  Dress in clean, freshly laundered night clothes.    Be sure to change bed sheets and blankets at least on the first night of CHG body wash use. May change linens every night of the above protocol for maximum benefit.   Day 5:  Last shower is the morning of surgery: Follow above Instructions.    NOTE:        *Keep CHG soap out of eyes and ear canals   *DO NOT wash with regular soap on your body after you have used the CHG soap solution  *DO NOT apply powders, lotions, or perfume.  *Deodorant may be used days 1-4, BUT NOT the day of surgery    Who should I contact if I have any questions regarding the use of CHG soap?  Call the [facility], Preadmission Testing at [phone] if you have any questions.              Patient Information: Pre-Operative Infection Prevention Measures     Why did I have my nose, under my arms and groin swabbed?  The purpose of the swab is to identify Staphylococcus aureus inside your nose or on your skin.   The swab was sent to the laboratory for culture.  A positive swab/culture for Staphylococcus aureus is called colonization or carriage.      What is Staphylococcus aureus?  Staphylococcus aureus, also known as “staph”, is a germ found on the skin or in the nose of healthy people.  Sometimes Staphylococcus aureus can get into the body and cause an infection.  This can be minor (such as pimples, boils or other skin problems).  It might also be serious (such as blood infection, pneumonia or a surgical site infection).    What is Staphylococcus aureus colonization or carriage?  Colonization or carriage means that a person has the germ but is not sick from it.  These bacteria can be spread on the hands or when breathing or sneezing.    How is Staphylococcus aureus spread?  It is most often spread by close contact with a person or item that carries it.    What happens if my culture is positive for Staphylococcus aureus?  Your doctor/medical team will use this information to guide any antibiotic treatment which may be necessary.  Regardless of the culture results, we will clean the inside of your nose with a betadine swab just before you have your surgery.      Will I get an infection if I have Staphylococcus aureus in my nose or on my skin?  Anyone can get an infection with Staphylococcus aureus.  However, the best way to reduce your risk of infection is to follow the instructions provided to you for the use of your CHG soap and dental rinse.        Who should I contact if I have any questions?  Call the [facility], Preadmission Testing at [phone] if you have any questions.           Patient Information: Oral/Dental Rinse  **This is a prescription; pick it up at your preferred local pharmacy **  What is oral/dental rinse?   It is a mouthwash. It is a way of cleaning the mouth with a germ killing solution before your surgery.  The solution contains chlorhexidine, commonly known as CHG.    It is used inside the mouth to kill a bacteria known as Staphylococcus aureus.  Let your doctor know if you are allergic to Chlorhexidine.    Why do I need to use CHG oral/dental rinse?  The CHG oral/dental rinse helps to kill a bacteria in your mouth known a Staphylococcus aureus.     This reduces the risk of infection at the surgical site.      Using your CHG oral/dental rinse  STEPS:  Use your CHG oral/dental rinse after you brush your teeth the night before (at bedtime) and the morning of your surgery.  Follow all directions on your prescription label.    Use the cap on the container to measure 15ml (fill cap to fill line)  Swish (gargle if you can) the mouthwash in your mouth for at least 30 seconds, (do not to swallow) spit out  After you use your CHG rinse, do not rinse your mouth with water, drink or eat.  Please refer to prescription label for the appropriate time to resume oral intake  Dental rinse comes in one size bottle: 473ml ~16oz.  You will have leftover    rinse, discard after this use.    What side effects might I have using the CHG oral/dental rinse?  CHG rinse will stick to plaque on the teeth.  Brush and floss just before use.  Teeth brushing will help avoid staining of plaque during use.    Who should I contact if I have questions about the CHG oral/dental rinse?  Please call [facility], Preadmission Testing at [phone] if you have any questions

## 2025-03-18 NOTE — CPM/PAT NURSE NOTE
"CPM/PAT Nurse Note      Name: Bell Ruano (Bell Ruano \"Estefany\")  /Age: 1945/79 y.o.       Past Medical History:   Diagnosis Date    Arthritis     Breast cancer (Multi)     right s/p surgery and radiation    Glaucoma     pressure stable    Hyperlipidemia     Hypertension     Hypothyroidism     Osteoporosis     Personal history of irradiation     Prediabetes     3/18/24 A1C 5.9%    Primary osteoarthritis of right hip     Plan: Right Hip Total Arthroplasty ~ Posterior Approach 3/25/25    Vitamin D deficiency        Past Surgical History:   Procedure Laterality Date    ANKLE FRACTURE SURGERY Right     ORIF    BREAST BIOPSY      BREAST LUMPECTOMY Right     PLEURA BIOPSY      benign    TUBAL LIGATION         Patient  reports that she is not currently sexually active and has had partner(s) who are male. She reports using the following method of birth control/protection: None.    Family History   Problem Relation Name Age of Onset    Valvular heart disease Mother      Pancreatic cancer Father Сергей Rand     Cancer Father Сергей Rand     Arthritis Brother Danny Rand     Other (diabetes mellitus) Brother Danny Rand     Diabetes Brother Danny Rand        No Known Allergies    Prior to Admission medications    Medication Sig Start Date End Date Taking? Authorizing Provider   atorvastatin (Lipitor) 10 mg tablet Take 1 tablet (10 mg) by mouth once daily. 10/24/23  Yes Santiago Hutchinson MD   cholecalciferol (Vitamin D3) 25 mcg (1000 units) tablet Take 1 tablet (1,000 Units) by mouth once daily.   Yes Historical Provider, MD   latanoprost (Xalatan) 0.005 % ophthalmic solution Administer into affected eye(s). 21  Yes Historical Provider, MD   levothyroxine (Synthroid, Levoxyl) 75 mcg tablet Take 1 tablet (75 mcg) by mouth once daily. 10/18/24  Yes Santiago Hutchinson MD   triamterene-hydrochlorothiazid (Dyazide) 37.5-25 mg capsule Take 1 capsule by mouth once daily. 24  Yes Santiago Hutchinson" MD   chlorhexidine (Peridex) 0.12 % solution 15 ml swish and spit for 30 seconds night prior to surgery and morning of surgery 3/18/25   Melissa Berg PA-C   calcium carbonate (Tums) 200 mg calcium chewable tablet Chew 2 tablets (1,000 mg) once daily. Patients choice. 7/7/16 3/18/25  Historical Provider, MD   ergocalciferol, vitamin D2, 50 mcg (2,000 unit) tablet Take 50 mcg by mouth once daily. 7/7/16 3/18/25  Historical Provider, MD   fluticasone (Cutivate) 0.05 % cream Apply topically 2 times a day. 3/16/23 3/18/25  Santiago Hutchinson MD        PAT ROS     DASI Risk Score    No data to display       Caprini DVT Assessment    No data to display       Modified Frailty Index    No data to display       [id number] Stroke Risk Points  Current as of just now        N/A 0 to 9 Points:      Last Change: N/A          The XOU6XV7-FURm risk score (Lip GH, et al. 2009. © 2010 American College [employer]) quantifies the risk of stroke for a patient with atrial fibrillation. For patients without atrial fibrillation or under the age of 18 this score appears as N/A. Higher score values generally indicate higher risk of stroke.        This score is not applicable to this patient. Components are not calculated.          Revised Cardiac Risk Index    No data to display       Apfel Simplified Score    No data to display       Risk Analysis Index Results This Encounter    No data found in the last 10 encounters.       Prodigy: High Risk  Total Score: 12              Prodigy Age Score           ARISCAT Score for Postoperative Pulmonary Complications      Flowsheet Row Pre-Admission Testing from 3/18/2025 in [facility] with Melissa Berg PA-C   Age Calculated Score 3 filed at 03/18/2025 1321   Preoperative SpO2 0 filed at 03/18/2025 1321   Respiratory infection in the last month Either upper or lower (i.e., URI, bronchitis, pneumonia), with fever and antibiotic treatment 0 filed at 03/18/2025 1321    Preoperative anemia (Hgb less than 10 g/dl) 0 filed at 03/18/2025 1321   Surgical incision  0 filed at 03/18/2025 1321   Duration of surgery  16 filed at 03/18/2025 1321   Emergency Procedure  0 filed at 03/18/2025 1321   ARISCAT Total Score  19 filed at 03/18/2025 1321          Benoit Perioperative Risk for Myocardial Infarction or Cardiac Arrest (SANIYA)    No data to display         Nurse Plan of Action:     After Visit Summary (AVS) reviewed and patient verbalized good understanding of medications and NPO instructions.  Pre-op infection prevention measures:  CHG showers and mouthwash reviewed, understanding voiced.  CHG soap given and patient verbalized need to pick CHG mouthwash at their preferred local pharmacy.

## 2025-03-18 NOTE — CPM/PAT H&P
"[facility]/PAT Evaluation       Name: Bell Ruano (Bell Ruano \"Estefany\")  /Age: 1945/79 y.o.     In-Person       Chief Complaint: Primary osteoarthritis of right hip [M16.11]     HPI      Date of Consult: 3/18/25    Referring Provider: Dr. Coleman    Surgery, Date, and Length: Right Hip Total Arthroplasty ~ Posterior Approach ; 3/25/25; 150 minutes     Bell Ruano  is a 79 year-old female who presents to the [facility] for perioperative risk assessment prior to surgery. Patient presented with right hip pain past several months. Symptoms made worse with activity such as walking, exercise. Patient claims her pain is limiting her ability to walk her dog or exercise which she typically enjoys. Has done therapy and feels strong but still having significant groin pain.   Got good relief from hip injection for about 4 weeks. Would like to go ahead with hip replacement.       This note was created in part upon personal review of patient's medical records.      Patient is scheduled to have Right Hip Total Arthroplasty ~ Posterior Approach     Medical History  Past Medical History:   Diagnosis Date    Arthritis     Breast cancer (Multi)     right s/p surgery and radiation    Glaucoma     pressure stable    Hyperlipidemia     Hypertension     Hypothyroidism     Osteoporosis     Personal history of irradiation     Prediabetes     3/18/24 A1C 5.9%    Primary osteoarthritis of right hip     Plan: Right Hip Total Arthroplasty ~ Posterior Approach 3/25/25    Vitamin D deficiency             Surgical History  Past Surgical History:   Procedure Laterality Date    ANKLE FRACTURE SURGERY Right     ORIF    BREAST BIOPSY      BREAST LUMPECTOMY Right     PLEURA BIOPSY      benign    TUBAL LIGATION               Family history:  Family History   Problem Relation Name Age of Onset    Valvular heart disease Mother      Pancreatic cancer Father Сергей Rand     Cancer Father Сергей Rand     Arthritis Brother Bill " "[place]ona     Other (diabetes mellitus) Brother Danny Rand     Diabetes Brother Danny Rand         Social history:  Social History     Socioeconomic History    Marital status:      Spouse name: Not on file    Number of children: Not on file    Years of education: Not on file    Highest education level: Not on file   Occupational History    Not on file   Tobacco Use    Smoking status: Former     Current packs/day: 0.00     Average packs/day: 1 pack/day for 20.0 years (20.0 ttl pk-yrs)     Types: Cigarettes     Start date: 1970     Quit date: 1990     Years since quittin.2    Smokeless tobacco: Never   Vaping Use    Vaping status: Never Used   Substance and Sexual Activity    Alcohol use: Yes     Alcohol/week: 14.0 standard drinks of alcohol     Types: 14 Standard drinks or equivalent per week    Drug use: Never    Sexual activity: Not Currently     Partners: Male     Birth control/protection: None   Other Topics Concern    Not on file   Social History Narrative    Not on file     Social Drivers of Health     Financial Resource Strain: Not on file   Food Insecurity: Not on file   Transportation Needs: Not on file   Physical Activity: Not on file   Stress: Not on file   Social Connections: Not on file   Intimate Partner Violence: Not on file   Housing Stability: Not on file        Current Outpatient Medications   Medication Instructions    atorvastatin (LIPITOR) 10 mg, oral, Daily    chlorhexidine (Peridex) 0.12 % solution 15 ml swish and spit for 30 seconds night prior to surgery and morning of surgery    cholecalciferol (VITAMIN D3) 1,000 Units, Daily    latanoprost (Xalatan) 0.005 % ophthalmic solution Administer into affected eye(s).    levothyroxine (SYNTHROID, LEVOXYL) 75 mcg, oral, Daily    triamterene-hydrochlorothiazid (Dyazide) 37.5-25 mg capsule 1 capsule, oral, Daily       Visit Vitals  /82   Pulse 85   Temp 36.8 °C (98.3 °F)   Resp 16   Ht 1.702 m (5' 7\")   Wt 68.2 kg (150 lb " 5.7 oz)   SpO2 97%   BMI 23.55 kg/m²   OB Status Postmenopausal   Smoking Status Former   BSA 1.8 m²               Review of Systems   Constitutional: Negative.    HENT: Negative.     Eyes: Negative.         Glasses   Respiratory: Negative.     Cardiovascular: Negative.         METS 4  +stairs / walk / house work Without chest pain / SOB    Gastrointestinal: Negative.    Endocrine: Negative.    Genitourinary: Negative.    Musculoskeletal: Negative.         Right hip pain   Skin: Negative.    Allergic/Immunologic: Negative.    Neurological: Negative.    Hematological: Negative.    Psychiatric/Behavioral: Negative.          Physical Exam  Vitals reviewed.   Constitutional:       Appearance: Normal appearance.   HENT:      Head: Normocephalic and atraumatic.      Right Ear: External ear normal.      Left Ear: External ear normal.      Nose: Nose normal.      Mouth/Throat:      Pharynx: Oropharynx is clear.   Eyes:      Extraocular Movements: Extraocular movements intact.      Conjunctiva/sclera: Conjunctivae normal.      Pupils: Pupils are equal, round, and reactive to light.   Cardiovascular:      Rate and Rhythm: Normal rate and regular rhythm.      Heart sounds: Normal heart sounds.   Pulmonary:      Effort: Pulmonary effort is normal.      Breath sounds: Normal breath sounds.   Abdominal:      Palpations: Abdomen is soft.   Musculoskeletal:         General: Normal range of motion.      Cervical back: Normal range of motion and neck supple.   Skin:     General: Skin is warm and dry.   Neurological:      General: No focal deficit present.      Mental Status: She is alert and oriented to person, place, and time.   Psychiatric:         Mood and Affect: Mood normal.         Behavior: Behavior normal.          PAT AIRWAY:   Airway:     Mallampati::  II    Neck ROM::  Full    Lab Results   Component Value Date    WBC 7.2 03/18/2025    HGB 13.7 03/18/2025    HCT 42.6 03/18/2025    MCV 85 03/18/2025     03/18/2025       Lab Results   Component Value Date    GLUCOSE 91 03/18/2025    CALCIUM 9.6 03/18/2025     03/18/2025    K 4.6 03/18/2025    CO2 30 03/18/2025     03/18/2025    BUN 22 03/18/2025    CREATININE 0.94 03/18/2025      Lab Results   Component Value Date    HGBA1C 5.9 (H) 03/18/2025      EKG 3/18/25  NSR  Left axis deviation  68 BPM     Patient is scheduled to have Right Hip Total Arthroplasty ~ Posterior Approach     Cardiovascular  METS: 4   RCRI: 0  , 3.9 % Risk of MACE  Caprini: 8    HTN- triamterene- hydrochlorothiazide Continue DOS   HLD- atorvastatin Continue DOS     Pulmonary  Stop Bang score is 2 placing patient at low risk for YAONDY  ARISCAT: <26 points, 1.6% risk of in-hospital postoperative pulmonary complication  PRODIGY: High risk for opioid induced respiratory depression      Neurology  No neurologic diagnosis, however, the patient is at increased risk for perioperative delirium secondary to  age, type and duration of surgery, Patient instructed on and provided cognitive exercises  Patient is at increased risk for perioperative CVA secondary to  HTN, increased age, operative time > 2.5 hours       Endocrinology  Hypothyroid - levothyroxine Continue DOS     Hematology       Patient instructed to ambulate as soon as possible postoperatively to decrease thromboembolic risk.      Initiate mechanical DVT prophylaxis as soon as possible and initiate chemical prophylaxis when deemed safe from a bleeding standpoint post surgery.       VTE prophylaxis per surgical team         Tests ordered in PAT: cbc, bmp, A1c, mrsa, EKG   LABS REVIEWED: unremarkable     Risk assessment complete.  Patient is scheduled for a intermediate surgical risk procedure.        Preoperative medication instructions were provided and reviewed with the patient.  Any additional testing or evaluation was explained to the patient.  Nothing by mouth instructions were discussed and patient's questions were answered prior to conclusion to  this encounter.  Patient verbalized understanding of preoperative instructions given in preadmission testing; discharge instructions available in EMR.

## 2025-03-18 NOTE — TELEPHONE ENCOUNTER
Called patient to discuss upcoming surgery. I left a voice message wanting to confirm that the plan is for a same-day discharge. To confirm the patient has obtained their medical equipment. To confirm if the patient has a care partner to assist them at home postoperatively. To confirm if they live in house and have stairs required for navigating the home. To confirm if the patient currently uses an assistive device.  Reminded patient to follow PAT instructions leading up to surgery and to watch for a phone call from preop the day prior to their surgery to receive details about arrival time. Patient was encouraged to return my call with any questions, concerns or if they need assistance with any of the above questions.

## 2025-03-19 LAB
ATRIAL RATE: 68 BPM
P AXIS: 41 DEGREES
P OFFSET: 186 MS
P ONSET: 130 MS
PR INTERVAL: 188 MS
Q ONSET: 224 MS
QRS COUNT: 11 BEATS
QRS DURATION: 86 MS
QT INTERVAL: 412 MS
QTC CALCULATION(BAZETT): 438 MS
QTC FREDERICIA: 429 MS
R AXIS: -37 DEGREES
T AXIS: 21 DEGREES
T OFFSET: 430 MS
VENTRICULAR RATE: 68 BPM

## 2025-03-20 LAB — STAPHYLOCOCCUS SPEC CULT: NORMAL

## 2025-03-21 ENCOUNTER — TELEPHONE (OUTPATIENT)
Dept: PREADMISSION TESTING | Facility: HOSPITAL | Age: 80
End: 2025-03-21
Payer: MEDICARE

## 2025-03-21 ENCOUNTER — TELEPHONE (OUTPATIENT)
Dept: PREADMISSION TESTING | Facility: HOSPITAL | Age: 80
End: 2025-03-21

## 2025-03-21 ENCOUNTER — OFFICE VISIT (OUTPATIENT)
Dept: PRIMARY CARE | Facility: CLINIC | Age: 80
End: 2025-03-21
Payer: MEDICARE

## 2025-03-21 VITALS
HEART RATE: 86 BPM | DIASTOLIC BLOOD PRESSURE: 82 MMHG | BODY MASS INDEX: 23.96 KG/M2 | SYSTOLIC BLOOD PRESSURE: 140 MMHG | TEMPERATURE: 97 F | OXYGEN SATURATION: 98 % | WEIGHT: 153 LBS

## 2025-03-21 DIAGNOSIS — N39.0 URINARY TRACT INFECTION WITHOUT HEMATURIA, SITE UNSPECIFIED: Primary | ICD-10-CM

## 2025-03-21 DIAGNOSIS — N39.0 URINARY TRACT INFECTION WITHOUT HEMATURIA, SITE UNSPECIFIED: ICD-10-CM

## 2025-03-21 LAB
POC APPEARANCE, URINE: CLEAR
POC BILIRUBIN, URINE: NEGATIVE
POC BLOOD, URINE: NEGATIVE
POC COLOR, URINE: COLORLESS
POC GLUCOSE, URINE: NEGATIVE MG/DL
POC KETONES, URINE: NEGATIVE MG/DL
POC LEUKOCYTES, URINE: NEGATIVE
POC NITRITE,URINE: NEGATIVE
POC PH, URINE: 7 PH
POC PROTEIN, URINE: NEGATIVE MG/DL
POC SPECIFIC GRAVITY, URINE: 1.01
POC UROBILINOGEN, URINE: 0.2 EU/DL

## 2025-03-21 PROCEDURE — 1159F MED LIST DOCD IN RCRD: CPT | Performed by: INTERNAL MEDICINE

## 2025-03-21 PROCEDURE — 81003 URINALYSIS AUTO W/O SCOPE: CPT | Performed by: INTERNAL MEDICINE

## 2025-03-21 PROCEDURE — 3077F SYST BP >= 140 MM HG: CPT | Performed by: INTERNAL MEDICINE

## 2025-03-21 PROCEDURE — 3079F DIAST BP 80-89 MM HG: CPT | Performed by: INTERNAL MEDICINE

## 2025-03-21 PROCEDURE — 99213 OFFICE O/P EST LOW 20 MIN: CPT | Performed by: INTERNAL MEDICINE

## 2025-03-21 RX ORDER — SULFAMETHOXAZOLE AND TRIMETHOPRIM 800; 160 MG/1; MG/1
1 TABLET ORAL 2 TIMES DAILY
Qty: 6 TABLET | Refills: 0 | Status: SHIPPED | OUTPATIENT
Start: 2025-03-21 | End: 2025-03-24

## 2025-03-21 ASSESSMENT — PATIENT HEALTH QUESTIONNAIRE - PHQ9
5. POOR APPETITE OR OVEREATING: NOT AT ALL
7. TROUBLE CONCENTRATING ON THINGS, SUCH AS READING THE NEWSPAPER OR WATCHING TELEVISION: NOT AT ALL
SUM OF ALL RESPONSES TO PHQ9 QUESTIONS 1 AND 2: 0
4. FEELING TIRED OR HAVING LITTLE ENERGY: NOT AT ALL
10. IF YOU CHECKED OFF ANY PROBLEMS, HOW DIFFICULT HAVE THESE PROBLEMS MADE IT FOR YOU TO DO YOUR WORK, TAKE CARE OF THINGS AT HOME, OR GET ALONG WITH OTHER PEOPLE: NOT DIFFICULT AT ALL
6. FEELING BAD ABOUT YOURSELF - OR THAT YOU ARE A FAILURE OR HAVE LET YOURSELF OR YOUR FAMILY DOWN: NOT AT ALL
SUM OF ALL RESPONSES TO PHQ QUESTIONS 1-9: 2
1. LITTLE INTEREST OR PLEASURE IN DOING THINGS: NOT AT ALL
9. THOUGHTS THAT YOU WOULD BE BETTER OFF DEAD, OR OF HURTING YOURSELF: NOT AT ALL
8. MOVING OR SPEAKING SO SLOWLY THAT OTHER PEOPLE COULD HAVE NOTICED. OR THE OPPOSITE, BEING SO FIGETY OR RESTLESS THAT YOU HAVE BEEN MOVING AROUND A LOT MORE THAN USUAL: NOT AT ALL
2. FEELING DOWN, DEPRESSED OR HOPELESS: NOT AT ALL
3. TROUBLE FALLING OR STAYING ASLEEP OR SLEEPING TOO MUCH: MORE THAN HALF THE DAYS

## 2025-03-21 ASSESSMENT — ANXIETY QUESTIONNAIRES
4. TROUBLE RELAXING: NOT AT ALL
7. FEELING AFRAID AS IF SOMETHING AWFUL MIGHT HAPPEN: NOT AT ALL
5. BEING SO RESTLESS THAT IT IS HARD TO SIT STILL: NOT AT ALL
GAD7 TOTAL SCORE: 0
6. BECOMING EASILY ANNOYED OR IRRITABLE: NOT AT ALL
2. NOT BEING ABLE TO STOP OR CONTROL WORRYING: NOT AT ALL
3. WORRYING TOO MUCH ABOUT DIFFERENT THINGS: NOT AT ALL
IF YOU CHECKED OFF ANY PROBLEMS ON THIS QUESTIONNAIRE, HOW DIFFICULT HAVE THESE PROBLEMS MADE IT FOR YOU TO DO YOUR WORK, TAKE CARE OF THINGS AT HOME, OR GET ALONG WITH OTHER PEOPLE: NOT DIFFICULT AT ALL
1. FEELING NERVOUS, ANXIOUS, OR ON EDGE: NOT AT ALL

## 2025-03-21 NOTE — PROGRESS NOTES
"Subjective   Patient ID: Bell Ruano \"Estefany\" is a 79 y.o. female who presents for No chief complaint on file..    Mrs. Demarco is in for a few days of burning post urination.  She states that her last urinary tract infection was approximately 5 years ago.  Not have a history of kidney stones but notes that especially postvoid that her symptoms she described it as \"\" her bottom is burning.  She is otherwise well and has no other symptoms.        Review of Systems   All other systems reviewed and are negative.      Previous history  Past Medical History:   Diagnosis Date    Arthritis     Breast cancer (Multi)     right s/p surgery and radiation    Glaucoma     pressure stable    Hyperlipidemia     Hypertension     Hypothyroidism     Osteoporosis     Personal history of irradiation     Prediabetes     3/18/24 A1C 5.9%    Primary osteoarthritis of right hip     Plan: Right Hip Total Arthroplasty ~ Posterior Approach 3/25/25    Vitamin D deficiency      Past Surgical History:   Procedure Laterality Date    ANKLE FRACTURE SURGERY Right     ORIF    BREAST BIOPSY      BREAST LUMPECTOMY Right     PLEURA BIOPSY      benign    TUBAL LIGATION       Social History     Tobacco Use    Smoking status: Former     Current packs/day: 0.00     Average packs/day: 1 pack/day for 20.0 years (20.0 ttl pk-yrs)     Types: Cigarettes     Start date: 1970     Quit date: 1990     Years since quittin.2    Smokeless tobacco: Never   Vaping Use    Vaping status: Never Used   Substance Use Topics    Alcohol use: Yes     Alcohol/week: 14.0 standard drinks of alcohol     Types: 14 Standard drinks or equivalent per week    Drug use: Never     Family History   Problem Relation Name Age of Onset    Valvular heart disease Mother      Pancreatic cancer Father Сергей Rand     Cancer Father Сергей Rand     Arthritis Brother Danny Rand     Other (diabetes mellitus) Brother Danny Rand     Diabetes Brother Danny Rand      No " "Known Allergies  Current Outpatient Medications   Medication Instructions    atorvastatin (LIPITOR) 10 mg, oral, Daily    chlorhexidine (Peridex) 0.12 % solution 15 ml swish and spit for 30 seconds night prior to surgery and morning of surgery    cholecalciferol (VITAMIN D3) 1,000 Units, Daily    latanoprost (Xalatan) 0.005 % ophthalmic solution Administer into affected eye(s).    levothyroxine (SYNTHROID, LEVOXYL) 75 mcg, oral, Daily    triamterene-hydrochlorothiazid (Dyazide) 37.5-25 mg capsule 1 capsule, oral, Daily       Objective       Physical Exam      Assessment/Plan   Bell Ruano \"Estefany\" is a 79 y.o. female who presents for the concerns below:    Assessment & Plan  Urinary tract infection without hematuria, site unspecified    Orders:    Urine Culture; Future    sulfamethoxazole-trimethoprim (Bactrim DS) 800-160 mg tablet; Take 1 tablet by mouth 2 times a day for 3 days.              Discussed with:   Return in :    Portions of this note were generated using digital voice recognition software, and may contain grammatical errors       Santiago Hutchinson MD  03/21/25  11:40 AM    "

## 2025-03-24 ENCOUNTER — TELEPHONE (OUTPATIENT)
Dept: ORTHOPEDIC SURGERY | Facility: CLINIC | Age: 80
End: 2025-03-24
Payer: MEDICARE

## 2025-03-24 NOTE — TELEPHONE ENCOUNTER
Pt is scheduled for surgery tomorrow she called and said she has the flu can she reschedule surgery.

## 2025-03-29 LAB — BACTERIA UR CULT: NORMAL

## 2025-04-09 ENCOUNTER — TELEPHONE (OUTPATIENT)
Dept: ORTHOPEDIC SURGERY | Facility: HOSPITAL | Age: 80
End: 2025-04-09
Payer: MEDICARE

## 2025-04-09 NOTE — TELEPHONE ENCOUNTER
Called patient to discuss upcoming surgery. Confirmed that the plan is for a same-day discharge. The patient has obtained their medical equipment. The patient does have a care partner to assist them at home postoperatively. They live in a condo.  The patient does not have stairs required for navigating the home. The patient currently does not use an assistive device. Reminded patient to follow PAT instructions leading up to surgery and to watch for a phone call from preop the day prior to their surgery to receive details about arrival time. All questions answered at this time.

## 2025-04-14 ENCOUNTER — ANESTHESIA EVENT (OUTPATIENT)
Dept: OPERATING ROOM | Facility: HOSPITAL | Age: 80
End: 2025-04-14
Payer: MEDICARE

## 2025-04-15 ENCOUNTER — APPOINTMENT (OUTPATIENT)
Dept: RADIOLOGY | Facility: HOSPITAL | Age: 80
End: 2025-04-15
Payer: MEDICARE

## 2025-04-15 ENCOUNTER — ANESTHESIA (OUTPATIENT)
Dept: OPERATING ROOM | Facility: HOSPITAL | Age: 80
End: 2025-04-15
Payer: MEDICARE

## 2025-04-15 ENCOUNTER — HOSPITAL ENCOUNTER (OUTPATIENT)
Facility: HOSPITAL | Age: 80
Setting detail: OUTPATIENT SURGERY
Discharge: HOME | End: 2025-04-15
Attending: ORTHOPAEDIC SURGERY | Admitting: ORTHOPAEDIC SURGERY
Payer: MEDICARE

## 2025-04-15 ENCOUNTER — DOCUMENTATION (OUTPATIENT)
Dept: HOME HEALTH SERVICES | Facility: HOME HEALTH | Age: 80
End: 2025-04-15

## 2025-04-15 ENCOUNTER — PHARMACY VISIT (OUTPATIENT)
Dept: PHARMACY | Facility: CLINIC | Age: 80
End: 2025-04-15
Payer: COMMERCIAL

## 2025-04-15 ENCOUNTER — HOME HEALTH ADMISSION (OUTPATIENT)
Dept: HOME HEALTH SERVICES | Facility: HOME HEALTH | Age: 80
End: 2025-04-15
Payer: MEDICARE

## 2025-04-15 VITALS
HEIGHT: 67 IN | TEMPERATURE: 97.3 F | SYSTOLIC BLOOD PRESSURE: 123 MMHG | HEART RATE: 82 BPM | BODY MASS INDEX: 24.01 KG/M2 | DIASTOLIC BLOOD PRESSURE: 71 MMHG | OXYGEN SATURATION: 94 % | RESPIRATION RATE: 14 BRPM | WEIGHT: 153 LBS

## 2025-04-15 DIAGNOSIS — M16.11 PRIMARY OSTEOARTHRITIS OF RIGHT HIP: Primary | ICD-10-CM

## 2025-04-15 PROCEDURE — 72170 X-RAY EXAM OF PELVIS: CPT | Performed by: RADIOLOGY

## 2025-04-15 PROCEDURE — 97530 THERAPEUTIC ACTIVITIES: CPT | Mod: GP

## 2025-04-15 PROCEDURE — 27130 TOTAL HIP ARTHROPLASTY: CPT | Performed by: ORTHOPAEDIC SURGERY

## 2025-04-15 PROCEDURE — 2500000001 HC RX 250 WO HCPCS SELF ADMINISTERED DRUGS (ALT 637 FOR MEDICARE OP): Performed by: ORTHOPAEDIC SURGERY

## 2025-04-15 PROCEDURE — A27130 PR TOTAL HIP ARTHROPLASTY: Performed by: STUDENT IN AN ORGANIZED HEALTH CARE EDUCATION/TRAINING PROGRAM

## 2025-04-15 PROCEDURE — 2500000004 HC RX 250 GENERAL PHARMACY W/ HCPCS (ALT 636 FOR OP/ED): Performed by: ANESTHESIOLOGIST ASSISTANT

## 2025-04-15 PROCEDURE — RXMED WILLOW AMBULATORY MEDICATION CHARGE

## 2025-04-15 PROCEDURE — 72170 X-RAY EXAM OF PELVIS: CPT

## 2025-04-15 PROCEDURE — 2500000004 HC RX 250 GENERAL PHARMACY W/ HCPCS (ALT 636 FOR OP/ED)

## 2025-04-15 PROCEDURE — C1713 ANCHOR/SCREW BN/BN,TIS/BN: HCPCS | Performed by: ORTHOPAEDIC SURGERY

## 2025-04-15 PROCEDURE — 2500000001 HC RX 250 WO HCPCS SELF ADMINISTERED DRUGS (ALT 637 FOR MEDICARE OP): Performed by: STUDENT IN AN ORGANIZED HEALTH CARE EDUCATION/TRAINING PROGRAM

## 2025-04-15 PROCEDURE — 7100000010 HC PHASE TWO TIME - EACH INCREMENTAL 1 MINUTE: Performed by: ORTHOPAEDIC SURGERY

## 2025-04-15 PROCEDURE — 7100000024 HC EXTENDED STAY RECOVERY PER MINUTE- PACU: Performed by: ORTHOPAEDIC SURGERY

## 2025-04-15 PROCEDURE — 2780000003 HC OR 278 NO HCPCS: Performed by: ORTHOPAEDIC SURGERY

## 2025-04-15 PROCEDURE — 97110 THERAPEUTIC EXERCISES: CPT | Mod: GP

## 2025-04-15 PROCEDURE — 2500000004 HC RX 250 GENERAL PHARMACY W/ HCPCS (ALT 636 FOR OP/ED): Performed by: ORTHOPAEDIC SURGERY

## 2025-04-15 PROCEDURE — 97535 SELF CARE MNGMENT TRAINING: CPT | Mod: GO

## 2025-04-15 PROCEDURE — 7100000009 HC PHASE TWO TIME - INITIAL BASE CHARGE: Performed by: ORTHOPAEDIC SURGERY

## 2025-04-15 PROCEDURE — 64466 THRC FASCIAL PLN BLK UNI NJX: CPT | Performed by: STUDENT IN AN ORGANIZED HEALTH CARE EDUCATION/TRAINING PROGRAM

## 2025-04-15 PROCEDURE — 97116 GAIT TRAINING THERAPY: CPT | Mod: GP

## 2025-04-15 PROCEDURE — C1776 JOINT DEVICE (IMPLANTABLE): HCPCS | Performed by: ORTHOPAEDIC SURGERY

## 2025-04-15 PROCEDURE — 2500000005 HC RX 250 GENERAL PHARMACY W/O HCPCS: Performed by: ANESTHESIOLOGIST ASSISTANT

## 2025-04-15 PROCEDURE — 2720000007 HC OR 272 NO HCPCS: Performed by: ORTHOPAEDIC SURGERY

## 2025-04-15 PROCEDURE — A27130 PR TOTAL HIP ARTHROPLASTY: Performed by: ANESTHESIOLOGIST ASSISTANT

## 2025-04-15 PROCEDURE — 2500000002 HC RX 250 W HCPCS SELF ADMINISTERED DRUGS (ALT 637 FOR MEDICARE OP, ALT 636 FOR OP/ED): Performed by: STUDENT IN AN ORGANIZED HEALTH CARE EDUCATION/TRAINING PROGRAM

## 2025-04-15 PROCEDURE — 2500000004 HC RX 250 GENERAL PHARMACY W/ HCPCS (ALT 636 FOR OP/ED): Mod: JZ | Performed by: STUDENT IN AN ORGANIZED HEALTH CARE EDUCATION/TRAINING PROGRAM

## 2025-04-15 PROCEDURE — 97165 OT EVAL LOW COMPLEX 30 MIN: CPT | Mod: GO

## 2025-04-15 PROCEDURE — 2500000005 HC RX 250 GENERAL PHARMACY W/O HCPCS: Performed by: STUDENT IN AN ORGANIZED HEALTH CARE EDUCATION/TRAINING PROGRAM

## 2025-04-15 PROCEDURE — 3700000002 HC GENERAL ANESTHESIA TIME - EACH INCREMENTAL 1 MINUTE: Performed by: ORTHOPAEDIC SURGERY

## 2025-04-15 PROCEDURE — 7100000002 HC RECOVERY ROOM TIME - EACH INCREMENTAL 1 MINUTE: Performed by: ORTHOPAEDIC SURGERY

## 2025-04-15 PROCEDURE — 3600000018 HC OR TIME - INITIAL BASE CHARGE - PROCEDURE LEVEL SIX: Performed by: ORTHOPAEDIC SURGERY

## 2025-04-15 PROCEDURE — 99100 ANES PT EXTEME AGE<1 YR&>70: CPT | Performed by: STUDENT IN AN ORGANIZED HEALTH CARE EDUCATION/TRAINING PROGRAM

## 2025-04-15 PROCEDURE — 97161 PT EVAL LOW COMPLEX 20 MIN: CPT | Mod: GP

## 2025-04-15 PROCEDURE — 3700000001 HC GENERAL ANESTHESIA TIME - INITIAL BASE CHARGE: Performed by: ORTHOPAEDIC SURGERY

## 2025-04-15 PROCEDURE — 3600000017 HC OR TIME - EACH INCREMENTAL 1 MINUTE - PROCEDURE LEVEL SIX: Performed by: ORTHOPAEDIC SURGERY

## 2025-04-15 PROCEDURE — A6213 FOAM DRG >16<=48 SQ IN W/BDR: HCPCS | Performed by: ORTHOPAEDIC SURGERY

## 2025-04-15 PROCEDURE — 2500000005 HC RX 250 GENERAL PHARMACY W/O HCPCS: Performed by: ORTHOPAEDIC SURGERY

## 2025-04-15 PROCEDURE — 7100000001 HC RECOVERY ROOM TIME - INITIAL BASE CHARGE: Performed by: ORTHOPAEDIC SURGERY

## 2025-04-15 DEVICE — M-SPEC METAL FEMORAL HEAD 12/14 TAPER DIAMETER 36MM +5: Type: IMPLANTABLE DEVICE | Site: HIP | Status: FUNCTIONAL

## 2025-04-15 DEVICE — PINNACLE CANCELLOUS BONE SCREW 6.5MM X 35MM
Type: IMPLANTABLE DEVICE | Site: HIP | Status: FUNCTIONAL
Brand: PINNACLE

## 2025-04-15 DEVICE — PINNACLE GRIPTION ACETABULAR SHELL SECTOR 58MM OD
Type: IMPLANTABLE DEVICE | Site: HIP | Status: FUNCTIONAL
Brand: PINNACLE GRIPTION

## 2025-04-15 DEVICE — PINNACLE CANCELLOUS BONE SCREW 6.5MM X 20MM
Type: IMPLANTABLE DEVICE | Site: HIP | Status: FUNCTIONAL
Brand: PINNACLE

## 2025-04-15 DEVICE — IMPLANTABLE DEVICE: Type: IMPLANTABLE DEVICE | Site: HIP | Status: FUNCTIONAL

## 2025-04-15 DEVICE — ACTIS DUOFIX HIP PROSTHESIS (FEMORAL STEM 12/14 TAPER CEMENTLESS SIZE 6 STD COLLAR)  CE
Type: IMPLANTABLE DEVICE | Site: HIP | Status: FUNCTIONAL
Brand: ACTIS

## 2025-04-15 RX ORDER — LEVOTHYROXINE SODIUM 50 UG/1
75 TABLET ORAL DAILY
Status: DISCONTINUED | OUTPATIENT
Start: 2025-04-16 | End: 2025-04-15 | Stop reason: HOSPADM

## 2025-04-15 RX ORDER — NORETHINDRONE AND ETHINYL ESTRADIOL 0.5-0.035
KIT ORAL AS NEEDED
Status: DISCONTINUED | OUTPATIENT
Start: 2025-04-15 | End: 2025-04-15

## 2025-04-15 RX ORDER — MELOXICAM 7.5 MG/1
7.5 TABLET ORAL DAILY
Qty: 14 TABLET | Refills: 0 | Status: SHIPPED | OUTPATIENT
Start: 2025-04-15 | End: 2025-04-29

## 2025-04-15 RX ORDER — MELOXICAM 7.5 MG/1
7.5 TABLET ORAL ONCE
Status: COMPLETED | OUTPATIENT
Start: 2025-04-15 | End: 2025-04-15

## 2025-04-15 RX ORDER — ROPIVACAINE HCL/PF 100MG/20ML
SYRINGE (ML) INJECTION AS NEEDED
Status: DISCONTINUED | OUTPATIENT
Start: 2025-04-15 | End: 2025-04-15

## 2025-04-15 RX ORDER — OXYCODONE HYDROCHLORIDE 5 MG/1
5 TABLET ORAL EVERY 6 HOURS PRN
Qty: 28 TABLET | Refills: 0 | Status: SHIPPED | OUTPATIENT
Start: 2025-04-15 | End: 2025-04-22

## 2025-04-15 RX ORDER — ATORVASTATIN CALCIUM 10 MG/1
10 TABLET, FILM COATED ORAL DAILY
Status: DISCONTINUED | OUTPATIENT
Start: 2025-04-15 | End: 2025-04-15 | Stop reason: HOSPADM

## 2025-04-15 RX ORDER — SODIUM CHLORIDE, SODIUM LACTATE, POTASSIUM CHLORIDE, CALCIUM CHLORIDE 600; 310; 30; 20 MG/100ML; MG/100ML; MG/100ML; MG/100ML
50 INJECTION, SOLUTION INTRAVENOUS CONTINUOUS
Status: ACTIVE | OUTPATIENT
Start: 2025-04-15 | End: 2025-04-15

## 2025-04-15 RX ORDER — SODIUM CHLORIDE 0.9 G/100ML
INJECTION, SOLUTION IRRIGATION AS NEEDED
Status: DISCONTINUED | OUTPATIENT
Start: 2025-04-15 | End: 2025-04-15 | Stop reason: HOSPADM

## 2025-04-15 RX ORDER — ACETAMINOPHEN 325 MG/1
650 TABLET ORAL EVERY 4 HOURS PRN
Status: DISCONTINUED | OUTPATIENT
Start: 2025-04-15 | End: 2025-04-15 | Stop reason: HOSPADM

## 2025-04-15 RX ORDER — TRAMADOL HYDROCHLORIDE 50 MG/1
100 TABLET ORAL EVERY 6 HOURS PRN
Qty: 28 TABLET | Refills: 0 | Status: SHIPPED | OUTPATIENT
Start: 2025-04-15 | End: 2025-04-29

## 2025-04-15 RX ORDER — SCOPOLAMINE 1 MG/3D
1 PATCH, EXTENDED RELEASE TRANSDERMAL ONCE
Status: DISCONTINUED | OUTPATIENT
Start: 2025-04-15 | End: 2025-04-15 | Stop reason: HOSPADM

## 2025-04-15 RX ORDER — NALOXONE HYDROCHLORIDE 0.4 MG/ML
0.2 INJECTION, SOLUTION INTRAMUSCULAR; INTRAVENOUS; SUBCUTANEOUS EVERY 5 MIN PRN
Status: DISCONTINUED | OUTPATIENT
Start: 2025-04-15 | End: 2025-04-15 | Stop reason: HOSPADM

## 2025-04-15 RX ORDER — LIDOCAINE HYDROCHLORIDE 10 MG/ML
0.1 INJECTION, SOLUTION EPIDURAL; INFILTRATION; INTRACAUDAL; PERINEURAL ONCE
Status: DISCONTINUED | OUTPATIENT
Start: 2025-04-15 | End: 2025-04-15 | Stop reason: HOSPADM

## 2025-04-15 RX ORDER — ONDANSETRON HYDROCHLORIDE 2 MG/ML
INJECTION, SOLUTION INTRAVENOUS AS NEEDED
Status: DISCONTINUED | OUTPATIENT
Start: 2025-04-15 | End: 2025-04-15

## 2025-04-15 RX ORDER — LIDOCAINE HYDROCHLORIDE 20 MG/ML
INJECTION, SOLUTION EPIDURAL; INFILTRATION; INTRACAUDAL; PERINEURAL AS NEEDED
Status: DISCONTINUED | OUTPATIENT
Start: 2025-04-15 | End: 2025-04-15

## 2025-04-15 RX ORDER — PHENYLEPHRINE HCL IN 0.9% NACL 1 MG/10 ML
SYRINGE (ML) INTRAVENOUS AS NEEDED
Status: DISCONTINUED | OUTPATIENT
Start: 2025-04-15 | End: 2025-04-15

## 2025-04-15 RX ORDER — CEFAZOLIN SODIUM 2 G/50ML
2 SOLUTION INTRAVENOUS EVERY 8 HOURS
Status: DISCONTINUED | OUTPATIENT
Start: 2025-04-15 | End: 2025-04-15 | Stop reason: HOSPADM

## 2025-04-15 RX ORDER — KETOROLAC TROMETHAMINE 30 MG/ML
15 INJECTION, SOLUTION INTRAMUSCULAR; INTRAVENOUS ONCE
Status: COMPLETED | OUTPATIENT
Start: 2025-04-15 | End: 2025-04-15

## 2025-04-15 RX ORDER — PROPOFOL 10 MG/ML
INJECTION, EMULSION INTRAVENOUS AS NEEDED
Status: DISCONTINUED | OUTPATIENT
Start: 2025-04-15 | End: 2025-04-15

## 2025-04-15 RX ORDER — PHENYLEPHRINE 10 MG/250 ML(40 MCG/ML)IN 0.9 % SOD.CHLORIDE INTRAVENOUS
CONTINUOUS PRN
Status: DISCONTINUED | OUTPATIENT
Start: 2025-04-15 | End: 2025-04-15

## 2025-04-15 RX ORDER — ALBUTEROL SULFATE 0.83 MG/ML
2.5 SOLUTION RESPIRATORY (INHALATION) ONCE AS NEEDED
Status: COMPLETED | OUTPATIENT
Start: 2025-04-15 | End: 2025-04-15

## 2025-04-15 RX ORDER — ACETAMINOPHEN 325 MG/1
975 TABLET ORAL ONCE
Status: COMPLETED | OUTPATIENT
Start: 2025-04-15 | End: 2025-04-15

## 2025-04-15 RX ORDER — NAPROXEN SODIUM 220 MG/1
81 TABLET, FILM COATED ORAL 2 TIMES DAILY
Qty: 28 TABLET | Refills: 0 | Status: SHIPPED | OUTPATIENT
Start: 2025-04-15 | End: 2025-04-29

## 2025-04-15 RX ORDER — OXYCODONE HYDROCHLORIDE 5 MG/1
10 TABLET ORAL EVERY 4 HOURS PRN
Status: DISCONTINUED | OUTPATIENT
Start: 2025-04-15 | End: 2025-04-15 | Stop reason: HOSPADM

## 2025-04-15 RX ORDER — FENTANYL CITRATE 50 UG/ML
INJECTION, SOLUTION INTRAMUSCULAR; INTRAVENOUS AS NEEDED
Status: DISCONTINUED | OUTPATIENT
Start: 2025-04-15 | End: 2025-04-15

## 2025-04-15 RX ORDER — PREGABALIN 75 MG/1
75 CAPSULE ORAL ONCE
Status: COMPLETED | OUTPATIENT
Start: 2025-04-15 | End: 2025-04-15

## 2025-04-15 RX ORDER — OXYCODONE HYDROCHLORIDE 5 MG/1
5 TABLET ORAL EVERY 6 HOURS PRN
Status: DISCONTINUED | OUTPATIENT
Start: 2025-04-15 | End: 2025-04-15 | Stop reason: HOSPADM

## 2025-04-15 RX ORDER — OXYCODONE HYDROCHLORIDE 5 MG/1
5 TABLET ORAL EVERY 4 HOURS PRN
Status: DISCONTINUED | OUTPATIENT
Start: 2025-04-15 | End: 2025-04-15 | Stop reason: HOSPADM

## 2025-04-15 RX ORDER — ACETAMINOPHEN 325 MG/1
650 TABLET ORAL EVERY 6 HOURS SCHEDULED
Status: DISCONTINUED | OUTPATIENT
Start: 2025-04-15 | End: 2025-04-15 | Stop reason: HOSPADM

## 2025-04-15 RX ORDER — DOCUSATE SODIUM 100 MG/1
100 CAPSULE, LIQUID FILLED ORAL 2 TIMES DAILY
Qty: 28 CAPSULE | Refills: 0 | Status: SHIPPED | OUTPATIENT
Start: 2025-04-15 | End: 2025-04-29

## 2025-04-15 RX ORDER — ONDANSETRON HYDROCHLORIDE 2 MG/ML
4 INJECTION, SOLUTION INTRAVENOUS ONCE AS NEEDED
Status: DISCONTINUED | OUTPATIENT
Start: 2025-04-15 | End: 2025-04-15 | Stop reason: HOSPADM

## 2025-04-15 RX ORDER — ROPIVACAINE/EPI/CLONIDINE/KET 2.46-0.005
SYRINGE (ML) INJECTION AS NEEDED
Status: DISCONTINUED | OUTPATIENT
Start: 2025-04-15 | End: 2025-04-15 | Stop reason: HOSPADM

## 2025-04-15 RX ORDER — TRANEXAMIC ACID 100 MG/ML
INJECTION, SOLUTION INTRAVENOUS AS NEEDED
Status: DISCONTINUED | OUTPATIENT
Start: 2025-04-15 | End: 2025-04-15

## 2025-04-15 RX ORDER — MIDAZOLAM HYDROCHLORIDE 1 MG/ML
INJECTION INTRAMUSCULAR; INTRAVENOUS AS NEEDED
Status: DISCONTINUED | OUTPATIENT
Start: 2025-04-15 | End: 2025-04-15

## 2025-04-15 RX ORDER — CEFAZOLIN SODIUM 2 G/50ML
2 SOLUTION INTRAVENOUS ONCE
Status: COMPLETED | OUTPATIENT
Start: 2025-04-15 | End: 2025-04-15

## 2025-04-15 RX ORDER — DROPERIDOL 2.5 MG/ML
0.62 INJECTION, SOLUTION INTRAMUSCULAR; INTRAVENOUS ONCE AS NEEDED
Status: DISCONTINUED | OUTPATIENT
Start: 2025-04-15 | End: 2025-04-15 | Stop reason: HOSPADM

## 2025-04-15 RX ADMIN — MEPIVACAINE HYDROCHLORIDE 4 ML: 15 INJECTION, SOLUTION EPIDURAL; INFILTRATION at 08:49

## 2025-04-15 RX ADMIN — PHENYLEPHRINE-NACL IV SOLUTION 10 MG/250ML-0.9% 0.29 MCG/KG/MIN: 10-0.9/25 SOLUTION at 10:04

## 2025-04-15 RX ADMIN — FENTANYL CITRATE 100 MCG: 50 INJECTION, SOLUTION INTRAMUSCULAR; INTRAVENOUS at 07:55

## 2025-04-15 RX ADMIN — Medication 100 MCG: at 08:58

## 2025-04-15 RX ADMIN — OXYCODONE HYDROCHLORIDE 10 MG: 5 TABLET ORAL at 11:26

## 2025-04-15 RX ADMIN — NORETHINDRONE AND ETHINYL ESTRADIOL 25 MG: KIT ORAL at 09:26

## 2025-04-15 RX ADMIN — Medication 1 L/MIN: at 11:15

## 2025-04-15 RX ADMIN — MIDAZOLAM HYDROCHLORIDE 2 MG: 1 INJECTION, SOLUTION INTRAMUSCULAR; INTRAVENOUS at 08:35

## 2025-04-15 RX ADMIN — HYDROMORPHONE HYDROCHLORIDE 0.5 MG: 1 INJECTION, SOLUTION INTRAMUSCULAR; INTRAVENOUS; SUBCUTANEOUS at 11:09

## 2025-04-15 RX ADMIN — HYDROMORPHONE HYDROCHLORIDE 0.5 MG: 1 INJECTION, SOLUTION INTRAMUSCULAR; INTRAVENOUS; SUBCUTANEOUS at 11:00

## 2025-04-15 RX ADMIN — KETOROLAC TROMETHAMINE 15 MG: 30 INJECTION, SOLUTION INTRAMUSCULAR at 14:31

## 2025-04-15 RX ADMIN — Medication 20 ML: at 07:58

## 2025-04-15 RX ADMIN — ONDANSETRON 4 MG: 2 INJECTION, SOLUTION INTRAMUSCULAR; INTRAVENOUS at 10:29

## 2025-04-15 RX ADMIN — LIDOCAINE HYDROCHLORIDE 50 MG: 20 INJECTION, SOLUTION EPIDURAL; INFILTRATION; INTRACAUDAL; PERINEURAL at 09:00

## 2025-04-15 RX ADMIN — MELOXICAM 7.5 MG: 7.5 TABLET ORAL at 07:25

## 2025-04-15 RX ADMIN — ACETAMINOPHEN 975 MG: 325 TABLET, FILM COATED ORAL at 07:25

## 2025-04-15 RX ADMIN — EPHEDRINE SULFATE 15 MG: 50 INJECTION, SOLUTION INTRAVENOUS at 09:10

## 2025-04-15 RX ADMIN — TRANEXAMIC ACID 1000 MG: 1 INJECTION, SOLUTION INTRAVENOUS at 09:00

## 2025-04-15 RX ADMIN — Medication 200 MCG: at 09:38

## 2025-04-15 RX ADMIN — ALBUTEROL SULFATE 2.5 MG: 2.5 SOLUTION RESPIRATORY (INHALATION) at 12:00

## 2025-04-15 RX ADMIN — POVIDONE-IODINE 1 APPLICATION: 5 SOLUTION TOPICAL at 07:27

## 2025-04-15 RX ADMIN — PROPOFOL 50 MCG/KG/MIN: 10 INJECTION, EMULSION INTRAVENOUS at 09:01

## 2025-04-15 RX ADMIN — PROPOFOL 50 MG: 10 INJECTION, EMULSION INTRAVENOUS at 09:00

## 2025-04-15 RX ADMIN — PREGABALIN 75 MG: 75 CAPSULE ORAL at 07:25

## 2025-04-15 RX ADMIN — SODIUM CHLORIDE, POTASSIUM CHLORIDE, SODIUM LACTATE AND CALCIUM CHLORIDE: 600; 310; 30; 20 INJECTION, SOLUTION INTRAVENOUS at 08:30

## 2025-04-15 RX ADMIN — SODIUM CHLORIDE, POTASSIUM CHLORIDE, SODIUM LACTATE AND CALCIUM CHLORIDE: 600; 310; 30; 20 INJECTION, SOLUTION INTRAVENOUS at 09:45

## 2025-04-15 RX ADMIN — MIDAZOLAM HYDROCHLORIDE 2 MG: 1 INJECTION, SOLUTION INTRAMUSCULAR; INTRAVENOUS at 07:55

## 2025-04-15 RX ADMIN — EPHEDRINE SULFATE 10 MG: 50 INJECTION, SOLUTION INTRAVENOUS at 09:23

## 2025-04-15 RX ADMIN — CEFAZOLIN SODIUM 2 G: 2 SOLUTION INTRAVENOUS at 09:00

## 2025-04-15 RX ADMIN — PROPOFOL 50 MG: 10 INJECTION, EMULSION INTRAVENOUS at 10:33

## 2025-04-15 RX ADMIN — DEXAMETHASONE SODIUM PHOSPHATE 4 MG: 4 INJECTION, SOLUTION INTRAMUSCULAR; INTRAVENOUS at 08:58

## 2025-04-15 ASSESSMENT — PAIN - FUNCTIONAL ASSESSMENT

## 2025-04-15 ASSESSMENT — COGNITIVE AND FUNCTIONAL STATUS - GENERAL
DRESSING REGULAR LOWER BODY CLOTHING: A LITTLE
HELP NEEDED FOR BATHING: A LITTLE
MOBILITY SCORE: 18
WALKING IN HOSPITAL ROOM: A LITTLE
STANDING UP FROM CHAIR USING ARMS: A LITTLE
MOVING FROM LYING ON BACK TO SITTING ON SIDE OF FLAT BED WITH BEDRAILS: A LITTLE
DRESSING REGULAR UPPER BODY CLOTHING: A LITTLE
CLIMB 3 TO 5 STEPS WITH RAILING: A LITTLE
DAILY ACTIVITIY SCORE: 20
TOILETING: A LITTLE
TURNING FROM BACK TO SIDE WHILE IN FLAT BAD: A LITTLE
MOVING TO AND FROM BED TO CHAIR: A LITTLE

## 2025-04-15 ASSESSMENT — PAIN SCALES - GENERAL
PAINLEVEL_OUTOF10: 2
PAINLEVEL_OUTOF10: 4
PAINLEVEL_OUTOF10: 3
PAINLEVEL_OUTOF10: 3
PAINLEVEL_OUTOF10: 2
PAINLEVEL_OUTOF10: 3
PAINLEVEL_OUTOF10: 7
PAINLEVEL_OUTOF10: 2
PAINLEVEL_OUTOF10: 3
PAINLEVEL_OUTOF10: 5 - MODERATE PAIN
PAINLEVEL_OUTOF10: 8
PAINLEVEL_OUTOF10: 3
PAINLEVEL_OUTOF10: 7
PAINLEVEL_OUTOF10: 4
PAINLEVEL_OUTOF10: 2
PAINLEVEL_OUTOF10: 7

## 2025-04-15 ASSESSMENT — ACTIVITIES OF DAILY LIVING (ADL)
ADL_ASSISTANCE: INDEPENDENT
EFFECT OF PAIN ON DAILY ACTIVITIES: OT TO TOLERANCE
ADL_ASSISTANCE: INDEPENDENT
EFFECT OF PAIN ON DAILY ACTIVITIES: PT TO TOLERANCE
HOME_MANAGEMENT_TIME_ENTRY: 15

## 2025-04-15 ASSESSMENT — PAIN DESCRIPTION - ORIENTATION
ORIENTATION: RIGHT

## 2025-04-15 ASSESSMENT — PAIN DESCRIPTION - DESCRIPTORS
DESCRIPTORS: ACHING
DESCRIPTORS: ACHING
DESCRIPTORS: ACHING;SORE

## 2025-04-15 ASSESSMENT — COLUMBIA-SUICIDE SEVERITY RATING SCALE - C-SSRS
6. HAVE YOU EVER DONE ANYTHING, STARTED TO DO ANYTHING, OR PREPARED TO DO ANYTHING TO END YOUR LIFE?: NO
1. IN THE PAST MONTH, HAVE YOU WISHED YOU WERE DEAD OR WISHED YOU COULD GO TO SLEEP AND NOT WAKE UP?: NO
2. HAVE YOU ACTUALLY HAD ANY THOUGHTS OF KILLING YOURSELF?: NO

## 2025-04-15 ASSESSMENT — PAIN DESCRIPTION - LOCATION
LOCATION: HIP
LOCATION: HIP

## 2025-04-15 NOTE — BRIEF OP NOTE
"Date: 4/15/2025  OR Location: [facility] OR    Name: Bell Ruano \"Estefany\", : 1945, Age: 79 y.o., MRN: [id number], Sex: female    Diagnosis  Pre-op Diagnosis      * Primary osteoarthritis of right hip [M16.11] Post-op Diagnosis     * Primary osteoarthritis of right hip [M16.11]     Procedures  Right Hip Total Arthroplasty ~ Posterior Approach  76953 - IL ARTHRP ACETBLR/PROX FEM PROSTC AGRFT/ALGRFT      Surgeons      * Zaki Coleman - Primary    Resident/Fellow/Other Assistant:  Surgeons and Role:     * Brandt Fisher MD - Resident - Assisting     * Santi Agustin MD - Resident - Assisting    Staff:   Circulator: Luis E Reis Person: Radha Naik Scrub: Nita    Anesthesia Staff: Anesthesiologist: Ca Hinkle MD  C-AA: TONI Willis    Procedure Summary  Anesthesia: Spinal  ASA: ASA status not filed in the log.  Estimated Blood Loss: 50mL  Intra-op Medications:   Administrations occurring from 0805 to 1035 on 04/15/25:   Medication Name Total Dose   sodium chloride 0.9 % irrigation solution 1,000 mL   sodium chloride 0.9 % irrigation solution 3,000 mL   ropivacaine-epinephrine-clonidine-ketorolac 2.46-0.005- 0.0008-0.3mg/mL periarticular syringe 50 mL   ceFAZolin (Ancef) 2 g in dextrose (iso) IV 50 mL 2 g   dexAMETHasone (Decadron) injection 4 mg/mL 4 mg   ePHEDrine injection 25 mg   ePHEDrine 50 mg/mL 25 mg   LR bolus Cannot be calculated   lidocaine PF (Xylocaine-MPF) local injection 2 % 50 mg   mepivacaine (Carbocaine) 1.5 % 4 mL   midazolam PF (Versed) injection 1 mg/mL 2 mg   ondansetron (Zofran) 2 mg/mL injection 4 mg   phenylephrine (French-Synephrine) 10 mg/250 mL NS (40 mcg/mL) infusion 0.56 mg   phenylephrine 100 mcg/mL syringe 10 mL (prefilled) 300 mcg   propofol (Diprivan) injection 10 mg/mL 353.66 mg   tranexamic acid injection 100 mg/mL 1,000 mg              Anesthesia Record               Intraprocedure I/O Totals          Intake    LR bolus 1250.00 mL    Tranexamic Acid 0.00 " mL    The total shown is the total volume documented since Anesthesia Start was filed.    Phenylephrine Drip 0.00 mL    The total shown is the total volume documented since Anesthesia Start was filed.    ceFAZolin (Ancef) 2 g in dextrose (iso) IV 50 mL 50.00 mL    Total Intake 1300 mL       Output    Est. Blood Loss 150 mL    Total Output 150 mL       Net    Net Volume 1150 mL          Specimen: No specimens collected               Findings: Hip DJD    Complications:  None; patient tolerated the procedure well.     Disposition: PACU - hemodynamically stable.  Condition: stable  Specimens Collected: No specimens collected

## 2025-04-15 NOTE — HH CARE COORDINATION
Home Care received a Referral for Physical Therapy. We have processed the referral for a Start of Care on 4/16/2025 .     If you have any questions or concerns, please feel free to contact us at [phone]. Follow the prompts, enter your five digit zip code, and you will be directed to your care team on [facility].

## 2025-04-15 NOTE — ANESTHESIA PROCEDURE NOTES
Peripheral Block    Patient location during procedure: pre-op  Start time: 4/15/2025 7:55 AM  End time: 4/15/2025 8:05 AM  Reason for block: at surgeon's request and post-op pain management  Staffing  Performed: attending   Authorized by: Ca Hinkle MD    Performed by: Ca Hinkle MD  Preanesthetic Checklist  Completed: patient identified, IV checked, site marked, risks and benefits discussed, surgical consent, monitors and equipment checked, pre-op evaluation and timeout performed   Timeout performed at: 4/15/2025 7:55 AM  Peripheral Block  Patient position: laying flat  Prep: ChloraPrep  Patient monitoring: heart rate and continuous pulse ox  Block type: QL  Laterality: right  Injection technique: single-shot  Guidance: ultrasound guided  Local infiltration: lidocaine  Infiltration strength: 1 %  Dose: 1 mL  Needle  Needle gauge: 22 G  Needle length: 8 cm  Needle localization: ultrasound guidance  Assessment  Injection assessment: negative aspiration for heme, no paresthesia on injection, incremental injection and local visualized surrounding nerve on ultrasound  Paresthesia pain: none  Heart rate change: no  Slow fractionated injection: yes  Additional Notes  Informed consent obtained  2mg versed and 100mcg fentanyl given as premedication  20cc 0.5% ropivacaine injected incrementally

## 2025-04-15 NOTE — PROGRESS NOTES
"Occupational Therapy    Evaluation/Treatment    Patient Name: Bell Ruano \"Estefany\"  MRN: [id number]  Department: [facility] PACU  Room: [facility] [place]  Today's Date: 04/15/25  Time Calculation  Start Time: 1307  Stop Time: 1342  Time Calculation (min): 35 min       Assessment:  OT Assessment: Pt completed ADLs and functional mobility with CGA-SUP and cues to maintain precautions.  Prognosis: Good  Barriers to Discharge Home: No anticipated barriers  Evaluation/Treatment Tolerance: Patient tolerated treatment well  Medical Staff Made Aware: Yes  End of Session Communication: Bedside nurse  End of Session Patient Position: On cart (in PACU, RN present)  OT Assessment Results: Decreased ADL status  Prognosis: Good  Barriers to Discharge: None  Evaluation/Treatment Tolerance: Patient tolerated treatment well  Medical Staff Made Aware: Yes  Strengths: Ability to acquire knowledge, Access to adaptive/assistive products, Attitude of self, Capable of completing ADLs semi/independent  Barriers to Participation: Comorbidities  Plan:  Treatment Interventions: ADL retraining, Functional transfer training, UE strengthening/ROM, Endurance training, Compensatory technique education  OT Frequency: 3 times per week  OT Discharge Recommendations: Low intensity level of continued care  Equipment Recommended upon Discharge: Wheeled walker  OT Recommended Transfer Status: Assist of 1, Stand by assist  OT - OK to Discharge: Yes  Treatment Interventions: ADL retraining, Functional transfer training, UE strengthening/ROM, Endurance training, Compensatory technique education    Subjective   Current Problem:  1. Primary osteoarthritis of right hip  aspirin 81 mg chewable tablet    traMADol (Ultram) 50 mg tablet    meloxicam (Mobic) 7.5 mg tablet    docusate sodium (Colace) 100 mg capsule    Referral to Home Health    oxyCODONE (Roxicodone) 5 mg immediate release tablet        General:   OT Received On: 04/15/25  General  Reason for " Referral: 79 y.o. female s/p Right Hip Total Arthroplasty ~ Posterior Approach  Referred By: Santi Agustin MD  Past Medical History Relevant to Rehab:   Past Medical History:   Diagnosis Date    Arthritis     Breast cancer 2016    right s/p surgery and radiation    Glaucoma     pressure stable    Hyperlipidemia     Hypertension     Hypothyroidism     Osteoporosis     Personal history of irradiation     Prediabetes     3/18/24 A1C 5.9%    Primary osteoarthritis of right hip     Plan: Right Hip Total Arthroplasty ~ Posterior Approach 3/25/25    Vitamin D deficiency      Family/Caregiver Present: Yes  Caregiver Feedback: pt's dtr present  Prior to Session Communication: Bedside nurse  Patient Position Received: On cart (in PACU)  Preferred Learning Style: auditory, kinesthetic, verbal, visual, written  General Comment: Pt pleasant and agreeable to OT   Precautions:  LE Weight Bearing Status: Weight Bearing as Tolerated  Medical Precautions: Fall precautions  Post-Surgical Precautions: Right hip precautions (posterior)  BP: 126/58     Pain:  Pain Assessment  Pain Assessment: 0-10  0-10 (Numeric) Pain Score: 2  Pain Type: Surgical pain  Pain Location: Hip  Pain Orientation: Right  Pain Descriptors: Aching  Pain Frequency: Intermittent  Pain Onset: Ongoing  Clinical Progression: Not changed  Effect of Pain on Daily Activities: OT to tolerance  Pain Interventions: Repositioned, Cold pack  Response to Interventions: Content/relaxed    Objective   Cognition:  Overall Cognitive Status: Within Functional Limits  Attention: Within Functional Limits  Memory: Within Funtional Limits  Problem Solving: Within Functional Limits  Numeric Reasoning: Within Functional Limits  Abstract Reasoning: Within Functional Limits  Safety/Judgement: Within Functional Limits  Insight: Within function limits     Home Living:  Type of Home: Condo  Lives With: Spouse  Home Adaptive Equipment: Walker rolling or standard, Reacher  Home Layout: One  level  Home Access: Stairs to enter with rails  Entrance Stairs-Rails: Both  Entrance Stairs-Number of Steps: 2  Bathroom Shower/Tub: Walk-in shower  Bathroom Toilet: Handicapped height  Bathroom Equipment: Shower chair with back  Home Living Comments: laundry on 1st floor  Prior Function:  Level of Stephan: Independent with ADLs and functional transfers, Independent with homemaking with ambulation  Receives Help From:  (pt's dtr and spouse can assist if needed upon d/c; pt's spouse uses a FWW but assists with IADLs)  ADL Assistance: Independent  Homemaking Assistance: Independent  Ambulatory Assistance: Independent  Vocational: Retired  Leisure: gardening  Prior Function Comments: denies falls, +driving     ADL:  ADL Comments: cues and education for compensatory strategies  Activities of Daily Living:      UE Dressing  UE Dressing Level of Assistance: Setup  UE Dressing Where Assessed: Edge of bed  UE Dressing Comments: pt donned shirt sitting EOB    LE Dressing  LE Dressing: Yes  LE Dressing Adaptive Equipment: Reacher  Pants Level of Assistance: Minimum assistance (cues to maintain hip precautions, pt able to bring down to R foot with reacher and min VC's/assist and pull up LE, pt able to thread into L LE, pull up over hips while standing)  Adult Briefs Level of Assistance: Minimum assistance (cues to sequence steps using reacher and donning R LE first, pt able to grasp waist band with reacher and thread into B LE's)  LE Dressing Where Assessed: Edge of bed  LE Dressing Comments: hip precautions maintained  Activity Tolerance:  Endurance: Tolerates 30 min exercise with multiple rests     Bed Mobility/Transfers: Bed Mobility  Bed Mobility: Yes  Bed Mobility 1  Bed Mobility 1: Supine to sitting, Sitting to supine  Level of Assistance 1: Contact guard, Minimal verbal cues  Bed Mobility Comments 1: cues to sequence steps to get out of bed and bring R LE off while maintaining precautions, CGA at R LE in/out of  bed for safety    Transfers  Transfer: Yes  Transfer 1  Technique 1: Sit to stand, Stand to sit  Transfer Device 1: Walker  Transfer Level of Assistance 1: Moderate verbal cues, Contact guard  Trials/Comments 1: Cues for hand placement    Functional Mobility:  Functional Mobility  Functional Mobility Performed: Yes  Functional Mobility 1  Surface 1: Level tile  Device 1: Rolling walker  Functional Mobility Support Devices: Gait belt  Assistance 1: Contact guard, Close supervision  Comments 1: CGA for safety progressing to SUP to complete functional ambulation min HH distance, increased time, cues for stepping  Sitting Balance:  Static Sitting Balance  Static Sitting-Balance Support: Feet supported  Static Sitting-Level of Assistance: Contact guard     Vision:Vision - Basic Assessment  Current Vision: No visual deficits  Sensation:  Light Touch: No apparent deficits  Strength:  Strength Comments: STEPH WISEMAN's WFl     Perception:  Inattention/Neglect: Appears intact  Coordination:  Movements are Fluid and Coordinated: Yes   Hand Function:  Hand Function  Gross Grasp: Functional  Coordination: Functional  Extremities: RUE   RUE : Within Functional Limits and LUE   LUE: Within Functional Limits    Outcome Measures: [facility] Daily Activity  Putting on and taking off regular lower body clothing: A little  Bathing (including washing, rinsing, drying): A little  Putting on and taking off regular upper body clothing: A little  Toileting, which includes using toilet, bedpan or urinal: A little  Taking care of personal grooming such as brushing teeth: None  Eating Meals: None  Daily Activity - Total Score: 20    Education Documentation  Handouts, taught by Cami Mathis OT at 4/15/2025  3:51 PM.  Learner: Patient  Readiness: Acceptance  Method: Explanation, Handout  Response: Verbalizes Understanding    Body Mechanics, taught by Cami Mathis OT at 4/15/2025  3:51 PM.  Learner: Patient  Readiness: Acceptance  Method: Explanation,  Handout  Response: Verbalizes Understanding    Precautions, taught by Cami Mathis OT at 4/15/2025  3:51 PM.  Learner: Patient  Readiness: Acceptance  Method: Explanation, Handout  Response: Verbalizes Understanding    Home Exercise Program, taught by Cami Mathis OT at 4/15/2025  3:51 PM.  Learner: Patient  Readiness: Acceptance  Method: Explanation, Handout  Response: Verbalizes Understanding    ADL Training, taught by Cami Mathis OT at 4/15/2025  3:51 PM.  Learner: Patient  Readiness: Acceptance  Method: Explanation, Handout  Response: Verbalizes Understanding    Education Comments  No comments found.           Goals:  Encounter Problems       Encounter Problems (Active)       ADLs       Patient with complete lower body dressing with modified independent level of assistance donning and doffing all LE clothes  with PRN adaptive equipment while maintaining precautions.        Start:  04/15/25    Expected End:  04/29/25            Patient will complete daily grooming tasks brushing teeth with stand by assist level of assistance and PRN adaptive equipment while standing.       Start:  04/15/25    Expected End:  04/29/25               MOBILITY       Patient will perform Functional mobility min Household distances/Community Distances with stand by assist level of assistance and front wheeled walker in order to improve safety and functional mobility.       Start:  04/15/25    Expected End:  04/29/25

## 2025-04-15 NOTE — DISCHARGE INSTRUCTIONS
Additional instructions  Ice/Elevate operative extremity.  OK to shower.  Keep Mepilex dressing in place.  Weightbearing: WBAT on operative extremity with crutches/walker.   Home PT to visit POD #1 or 2.   Start Tylenol 650 mg by mouth every 6 hours  for pain.  Start Oxycodone 1 by mouth every 6 hours as needed for pain.  Tramadol 1 tablet every 6  hours as needed for breakthrough pain.  Start Meloxicam 7.5 mg daily for 2 weeks POD #1.  Colace 100 mg twice a day for constipation.  Aspirin 81 mg by mouth twice daily. Start POD #1.  F/U with Dr. Coleman in 2 weeks.  Call [phone] for appointment time.      Postoperative Instructions: Total Joint Replacement    POSTOPERATIVE MEDICATIONS  PAIN MEDICATION  Pain medications have been prescribed for post-operative pain control. Take in conjunction with ice/cold therapy to assist with swelling and pain. If prescribed multiple pain medications, be sure to alternate administration times throughout the day so that you can take something every few hours. Consider taking Tylenol in between narcotic administration times (keep in mind Percocet/Vicodin contain Tylenol and not to exceed Tylenol limit of 4grams in 24 hours). Prescription will generally be for 7 days at a time and refills will be sent upon request. For refills, request via [facility] or call surgeon's office during business hours and follow up with your pharmacy regarding status and pickup.    Side effects may be constipation and nausea, vomiting, sleepiness, dizziness, lightheadedness, headache, blurred vision, dry mouth, sweating, itching (if you have itching, over-the -counter Benadryl can be used as needed).  You may NOT operate a motor vehicle while taking narcotic pain medication.    BLOOD THINNER  Aspirin or another medication has been prescribed as a blood thinner to prevent blood clots in your leg or lungs. Take as prescribed on the bottle. You will not receive a refill on this medication.  Do not take this  medication if you are on another blood thinner.  Do not take any anti-inflammatory medications such as Meloxicam, Celebrex, Ibuprofen, Motrin, Aleve, or Advil while using the Aspirin or another blood thinner unless instructed otherwise by your surgeon.       STOOL SOFTENERS/LAXATIVES  Post-operative constipation can result due to a combination of inactivity, anesthesia and pain medication. To help prevent this, you should increase your water and fiber intake. Physical activity, such as walking, will also help stimulate the bowels. If you are not having regular bowel movements, increase your bowel regimen!  Consider constipation prevention and treatment medications if not prescribed by your surgeon. These are available over the counter at the drug store (The pharmacist on staff may also make recommendations):  Stool Softener: Colace  Laxative: Senna, Miralax, Milk of Magnesia, Magnesium Citrate    WOUND CARE  Pain and swelling are normal following surgery and can last for weeks to months depending on the patient.  To help relieve these symptoms, please follow the post-operative pain regimen as it has been prescribed, use ice often, wear compression stockings every day as prescribed, and elevate your leg every hour.   You have a waterproof bandage on your wound and may shower with this on. The waterproof bandage is to remain in place for a minimum of 6 -7 days. Home care will remove it. You may leave your incision open to air after the bandage has been removed. Once the dressing is removed, you may see steri strips, surgical mesh, or glue. Do not peel or cut any of these items, they will fall off on their own or your surgeon will address at your follow up appointment.   DO NOT soak your incision in a bath, hot tub, pool or pond/lake for a minimum of 8 weeks following your surgery.  DO NOT use lotions, creams, ointments on your wound for a minimum of 6 weeks following your surgery. At that time you may use vitamin E to  "assist with softening of your incision.    ___X__ TOTAL HIP ARTHROPLASTY  After surgery, you will have a compression stocking on your operative leg. Continue to wear the compression stocking for 4 weeks to prevent blood clots in your leg or lungs. Remove compression stockings at night.  If there is continued drainage or bleeding, cover with an abdominal pad and tape.   posterior hip precautions:   Don't lean forward while you sit down or stand up, and don't bend past 90 degrees (like the angle in a letter \"L\"). This means you can't try to  something off the floor or bend down to tie your shoes. Don't lift your knee higher than your hip.  Don't sit on low chairs, beds, or toilets. You may want to use a raised toilet seat for a while. Sit in chairs with arms. Imagine there's a line running down the middle of your body. Keep your legs from crossing over it.  When you get into a car, back up to the seat of the car, and then sit and slide across the seat toward the middle of the car with your knees about 12 inches apart. A plastic bag on the seat can help you slide in and out of the car.  Don't cross your legs when you sit.  Don't cross your ankles while lying down.  It may help to keep a pillow between your knees when you're in bed.    _____ TOTAL KNEE ARTHROPLASTY  After surgery, you will have a compression stocking and ACE wrap on your operative leg. Continue to wear the compression stocking for 4 weeks to prevent blood clots in your leg or lungs. Remove compression stockings at night. ACE wrap can be removed on postop day 1 or removed by homecare therapist at their first visit. Can use ACE wrap intermittently for swelling.  Elevate your leg periodically to help with swelling. BE SURE to place the support under your ankle, NOT under your knee. You want your knee as straight as possible.    JOINT CARE TEAM  For nursing or wound care questions within the first 6 weeks after surgery, please contact the joint " replacement nurse at the facility where you had surgery.  If you are leaving a message, please include your full name, date of birth and date of surgery so that we can identify correctly identify you.  For messages left outside of normal business hours, your call will be returned on the next business day.  Please do not leave emergent messages outside of normal business hours that cannot wait until the next business day.  For orthopedic concerns longer than 6 weeks after your surgery, you will need to call the office to schedule an appointment to be seen.    [facility]:  Glen Zeng RN, [phone] or Vannessa Meyer RN, [phone]        RESTARTING HOME MEDICATIONS/DIET  You may restart your home medications the following day after your surgery UNLESS you have been given alternate instructions.  Follow the instructions given to you on your hospital discharge instructions for more information regarding your home medications.  Resume your normal diet after surgery. If you are on a specific type of diet for your condition, resume that instead.  Choose foods that help promote good bowel habits and prevent constipation, such as foods high in fiber. Be sure to drink water to stay hydrated and to prevent constipation.       DENTAL PROCEDURES & CLEANINGS  All patients must wait a minimum of 3 months for elective dental appointments, including routine cleanings, as to prevent total joint replacement infections. Please refer to your surgeon as to whether you will need antibiotics for future dental appointments.     EMERGENCIES  When to contact our office immediately:  Fever >101.5 for at least 48 hours after surgery or chills.  Excessive bleeding from incision(s). A small amount of drainage is normal and expected.  Signs of infection of incision(s)-excessive drainage that is soaking through your dressing (especially if it is pus-like), redness that is spreading out from the edges of your incision, or  increased warmth around the area.  Excruciating pain for which the pain medication, taken as instructed, is not helping.  Severe calf pain.  Go directly to the emergency room or call 911, if you are experiencing chest pain, shortness of breath, or difficulty breathing.    IN-HOME PHYSICAL THERAPY & OUTPATIENT PHYSICAL THERAPY  In-home physical therapy will start 1-2 days after you get home from the hospital.  The home care agency will call you prior to their first visit.  Please refer to the contact information on your hospital discharge instructions if you need to contact them.  Make sure to provide a phone number with the ability for the home care staff to leave a message if you do not answer your phone.  Please continue to complete the assigned home exercises two times per day on the days that physical therapy is not scheduled to come to your home.    Following a total knee replacement, you should plan to transition from in-home therapy to outpatient therapy 2-3 weeks after surgery.  Patients should be making their first appointment several weeks in advance to avoid delays. You may use the physical therapy location of your choice; it is the patient's responsibility to make sure the location chosen is covered by insurance.  If you are having a hip replacement and need additional therapy, please contact the office for an order.    It is common to have a temporary increase in pain and swelling upon starting outpatient physical therapy and/or changing your exercise routine.  Continue to use ice to help with symptoms.    DRIVING & TRAVEL AFTER SURGERY   Patients should anticipate waiting at least 4-6 weeks before traveling long distances after surgery.  You will need to stop to walk around ever 1 hour during your travel to help with blood clot prevention.  Please call the office or your joint nurse to discuss prior to post-surgical travel.  Patients may not drive until cleared by the joint nurse or the  office.    FOLLOW-UP APPOINTMENT  Please call your surgeon's office within 2 weeks following surgery to schedule a follow up visit.    ICE  You have been prescribed to ice your total joint at a minimum of twice per hour for 20 minutes while awake during the first 6 weeks after surgery if you are using ice packs. This will help with pain control. Be sure to have a layer of protection between the ice pack and your skin. Ice packs should be rotated on for 20 minutes and off for 20 minutes to prevent frost bite.   If you are using an ice machine, please follow ice machine instructions and tips below.    Polar Care Cold Therapy Machine Recommendations    Cold therapy devices can be used before and after surgery to assist in comfort and help to reduce pain and swelling.  These devices differ from ice or ice packs whereas the mechanism circulates water through tubing and a pad to provide longer periods of cold therapy to the desired site.  While in the hospital, you can use your cold devices around the clock for optimal comfort.  We recommend using cold therapy after working with therapy or completing exercises on your own.  Once you are discharged home, there is no set schedule in which you must follow while using cold therapy.  Below are a few points to remember when using a cold therapy device:    Read the 's instructions prior to first the use.  Follow instructions for filling the cooler (water first, then ice).  Always make sure there is a layer of protection between the cold pad and your skin (Clothing, Towel, Ace Bandage, etc.)  Allow the device to circulate cold water throughout the pad prior wrapping the pad around your leg (approximately 10 minutes).  Place the pad on your leg in the desired position to meet your pain management needs and use the wraps provided to secure the pad to your body.  The purpose of this device is to use consistently throughout the day.  You do not need to need to use the 20  on, 20 off method.  During waking hours, remove the cold pad every 1-2 hours to perform a skin check  Detach the pad from the cooler and ambulate at least once every hour  After removing the pad, allow at least 30 minutes before resuming cold therapy  You may wear the cold therapy device during periods of sleep including overnight    If you wake up during the night, you can check the skin at this time.  You do not need to wake up specifically to perform skin checks.  Empty the cooler and pad when device is not in use.  Follow 's instructions for cleaning your cold therapy device.

## 2025-04-15 NOTE — OP NOTE
"Right Hip Total Arthroplasty ~ Posterior Approach (R) Operative Note     Date: 4/15/2025  OR Location: [facility] OR    Name: Bell Ruano \"Estefany\", : 1945, Age: 79 y.o., MRN: [id number], Sex: female    Diagnosis  Pre-op Diagnosis      * Primary osteoarthritis of right hip [M16.11] Post-op Diagnosis     * Primary osteoarthritis of right hip [M16.11]     Procedures  Right Hip Total Arthroplasty ~ Posterior Approach  02304 - PA ARTHRP ACETBLR/PROX FEM PROSTC AGRFT/ALGRFT      Surgeons      * Zaki Coleman - Primary    Resident/Fellow/Other Assistant:  Surgeons and Role:     * Brandt Fisher MD - Resident - Assisting     * Santi Agustin MD - Resident - Assisting    Staff:   Circulator: Luis E Reis Person: Radha Naik Scrub: Nita    Anesthesia Staff: Anesthesiologist: Ca Hinkle MD  C-AA: TONI Willis    Procedure Summary  Anesthesia: Spinal  ASA: ASA status not filed in the log.  Estimated Blood Loss: 50mL  Intra-op Medications:   Administrations occurring from 0805 to 1035 on 04/15/25:   Medication Name Total Dose   sodium chloride 0.9 % irrigation solution 1,000 mL   sodium chloride 0.9 % irrigation solution 3,000 mL   ropivacaine-epinephrine-clonidine-ketorolac 2.46-0.005- 0.0008-0.3mg/mL periarticular syringe 50 mL   ceFAZolin (Ancef) 2 g in dextrose (iso) IV 50 mL 2 g   dexAMETHasone (Decadron) injection 4 mg/mL 4 mg   ePHEDrine injection 25 mg   ePHEDrine 50 mg/mL 25 mg   LR bolus Cannot be calculated   lidocaine PF (Xylocaine-MPF) local injection 2 % 50 mg   mepivacaine (Carbocaine) 1.5 % 4 mL   midazolam PF (Versed) injection 1 mg/mL 2 mg   ondansetron (Zofran) 2 mg/mL injection 4 mg   phenylephrine (French-Synephrine) 10 mg/250 mL NS (40 mcg/mL) infusion 0.56 mg   phenylephrine 100 mcg/mL syringe 10 mL (prefilled) 300 mcg   propofol (Diprivan) injection 10 mg/mL 353.66 mg   tranexamic acid injection 100 mg/mL 1,000 mg              Anesthesia Record               Intraprocedure " "I/O Totals          Intake    LR bolus 1250.00 mL    Tranexamic Acid 0.00 mL    The total shown is the total volume documented since Anesthesia Start was filed.    Phenylephrine Drip 0.00 mL    The total shown is the total volume documented since Anesthesia Start was filed.    ceFAZolin (Ancef) 2 g in dextrose (iso) IV 50 mL 50.00 mL    Total Intake 1300 mL       Output    Est. Blood Loss 150 mL    Total Output 150 mL       Net    Net Volume 1150 mL          Specimen: No specimens collected              Drains and/or Catheters: * None in log *    Tourniquet Times:         Implants:  Implants       Type Name Action Serial No.      Joint Hip ACETABULAR CUP, SECTOR, GRIPTON, SIZE 58MM - SN/A - [id number] Implanted N/A     Screw SCREW CANCELLOUS 6.5 X 30 - SN/A - [id number] Wasted N/A     Screw SCREW CANCELLOUS 6.5 X 20 - SN/A - [id number] Implanted N/A     Joint SCREW, CANCELLOUS 6.5 X 35 - SN/A - [id number] Implanted N/A     Joint Hip ACETABULAR CUP, SECTOR, GRIPTON, SIZE 58MM - SN/A - [id number] Implanted N/A     Joint Hip STEM, FEMORAL, ACTIS COLLAR, STD, SIZE 6 - SN/A - [id number] Implanted N/A     Joint Hip HIP BALL ARTIC/INNA 36+5 - SN/A - [id number] Implanted N/A              Findings: severe OA R hip    Indications: Bell Ruano \"Estefany\" is an 79 y.o. female who is having surgery for Primary osteoarthritis of right hip [M16.11]. Endstage OA R hip . Here for total hip replacement    The patient was seen in the preoperative area. The risks, benefits, complications, treatment options, non-operative alternatives, expected recovery and outcomes were discussed with the patient. The possibilities of reaction to medication, pulmonary aspiration, injury to surrounding structures, bleeding, recurrent infection, the need for additional procedures, failure to diagnose a condition, and creating a complication requiring transfusion or operation were discussed with the patient. The patient concurred with the proposed " plan, giving informed consent.  The site of surgery was properly noted/marked if necessary per policy. The patient has been actively warmed in preoperative area. Preoperative antibiotics have been ordered and given within 1 hours of incision. Venous thrombosis prophylaxis are not indicated.    Procedure Details:   Postop DX: End-stage right hip arthritis  Postop DX: Same   Procedure: Right total hip arthroplasty  Surgeon: Zaki Coleman MD  Asst : Santi Agustin MD; Mello Fisher MD  Anesthesia: Spinal and local  Implants: Actis 6 femoral stem, +5 mm neck, 36 mm metal head, 58 mm [place] cup, plus/10 degree liner.  2 screws.  Clinical Note: 79 year-old female with end-stage arthrosis of hip here for total hip arthroplasty.  Discussed risk of surgery including but not limited to dislocation, bleeding, infection, fracture, DVT, instability, possible need for further surgery.  Understood these risks wished proceed.  Procedure Note: Patient brought to operating room.  Timeout performed. Antibiotics given IV.  TXA 1 gm given IV.  Spinal anesthesia given.  Transferred to the OR table.  Placed in the lateral decubitus position with the operative hip up.  All bony problems were padded well.  Axillary roll was placed.  Hip was prepped and draped typical sterile fashion.  Posterior approach to the hip was performed.  Incision through skin down to gluteal fascia.  External rotators and capsule elevated off the femoral neck and tagged.  Hip was dislocated.  Femoral neck osteotomy was performed.  Periacetabular retractors were placed.  Labrum was excised.  Acetabulum was reamed up to 57 mm,  Trial fit nicely with reasonable fixation.  58 mm Acetabular cup was impacted in place with good fixation.  2 pelvic screws were placed for improved fixation.  Plus/10 degree polyethylene liner was used.  Femur was broached using the Actis femoral implant system.  Broached to stable size 6 trial stem  with good fixation in the bone.  Reduced  with a +5 mm neck.    Equal leg lengths.  Trial components removed.  Femoral stem was impacted in place with good fixation.  Reduced with 36 mm metal head.  Good flexibility and stability.  Hip was irrigated.  Capsule and external rotators repaired through drill holes.  Wounds closed in layers.  Soft compressive dressing applied. Tolerated procedure well.  Taken recovery in stable condition.  Complications:  None; patient tolerated the procedure well.    Disposition: PACU - hemodynamically stable.  Condition: stable                 Additional Details: none    Attending Attestation: I was present and scrubbed for the entire procedure.    Zaki Coleman  Phone Number: [phone]

## 2025-04-15 NOTE — ANESTHESIA PROCEDURE NOTES
Spinal Block    Patient location during procedure: OR  Start time: 4/15/2025 8:45 AM  End time: 4/15/2025 8:49 AM  Reason for block: primary anesthetic  Staffing  Performed: attending   Authorized by: Ca Hinkle MD    Performed by: TONI Willis    Preanesthetic Checklist  Completed: patient identified, IV checked, risks and benefits discussed, surgical consent, pre-op evaluation, timeout performed and sterile techniques followed  Block Timeout  RN/Licensed healthcare professional reads aloud to the Anesthesia provider and entire team: Patient identity, procedure with side and site, patient position, and as applicable the availability of implants/special equipment/special requirements.    Timeout performed at:   Spinal Block  Patient position: sitting  Prep: DuraPrep  Sterility prep: cap, gloves, mask and drape  Sedation level: light sedation  Patient monitoring: blood pressure, continuous pulse oximetry, EKG and ETCO2  Approach: right paramedian  Vertebral space: L2-3  Injection technique: single-shot  Needle  Needle type: pencil-point   Needle gauge: 24 G  Needle length: 4 in  Free flowing CSF: yes    Assessment  Sensory level: T10 bilateral  Block outcome: patient comfortable  Procedure assessment: patient sedated but conversant throughout procedure and patient tolerated procedure well with no immediate complications  Additional Notes  First attempt by TONI Nelson met with osseous resistance, attempted by Anesthesiologist Dr. Hinkle but unable to place due to os. Dr. Bravo called and came in to assist by placing spinal under paramedian approach.

## 2025-04-15 NOTE — NURSING NOTE
Reached out to  from anesthesia because patient's pre-op vital were 168/83 and patient has been around 114/60. Okay for the patient to work with therapy and be discharged as long as she is not sympathic and passes with therapy   1315- patient worked with Ruby from therapy. Dressed with no problems

## 2025-04-15 NOTE — ANESTHESIA PREPROCEDURE EVALUATION
"Patient: Bell Ruano \"Estefany\"    Procedure Information       Anesthesia Start Date/Time: 04/15/25 0830    Procedure: Right Hip Total Arthroplasty ~ Posterior Approach (Right: Hip)    [facility]: [facility]    Surgeons: Zaki Coleman MD            Relevant Problems   Anesthesia (within normal limits)      Cardiac   (+) Benign essential hypertension   (+) Pure hypercholesterolemia      Pulmonary   (+) Pneumonia      Endocrine   (+) Hypothyroidism      Musculoskeletal   (+) Primary osteoarthritis of right hip      ID   (+) Pneumonia      GYN   (+) Cancer of upper-outer quadrant of female breast       Clinical information reviewed:   Tobacco  Allergies  Meds   Med Hx  Surg Hx  OB Status  Fam Hx  Soc   Hx        NPO Detail:  NPO/Void Status  Carbohydrate Drink Given Prior to Surgery? : N  Date of Last Liquid: 04/15/25  Time of Last Liquid: 0430  Date of Last Solid: 04/14/25  Time of Last Solid: 2000  Last Intake Type: Clear fluids  Time of Last Void: 0700         Physical Exam    Airway  Mallampati: III  TM distance: >3 FB  Neck ROM: full     Cardiovascular   Rhythm: regular  Rate: normal     Dental - normal exam     Pulmonary   Breath sounds clear to auscultation     Abdominal            Anesthesia Plan    History of general anesthesia?: yes  History of complications of general anesthesia?: no    ASA 3     spinal and general     intravenous induction   Anesthetic plan and risks discussed with patient.  Use of blood products discussed with patient who consented to blood products.    Plan discussed with attending.      "

## 2025-04-15 NOTE — ANESTHESIA PROCEDURE NOTES
Spinal Block    Patient location during procedure: OR  Start time: 4/15/2025 9:19 AM  End time: 4/15/2025 9:19 AM  Reason for block: primary anesthetic  Staffing  Performed: TONI   Authorized by: Ca Hinkle MD    Performed by: TONI Willis    Preanesthetic Checklist  Completed: patient identified, IV checked, risks and benefits discussed, surgical consent, pre-op evaluation, timeout performed and sterile techniques followed  Block Timeout  RN/Licensed healthcare professional reads aloud to the Anesthesia provider and entire team: Patient identity, procedure with side and site, patient position, and as applicable the availability of implants/special equipment/special requirements.    Timeout performed at:   Spinal Block  Patient position: sitting  Prep: DuraPrep  Sterility prep: cap, mask, gloves and drape  Sedation level: light sedation  Patient monitoring: blood pressure, continuous pulse oximetry, EKG and ETCO2  Approach: midline  Vertebral space: L3-4  Injection technique: single-shot  Needle  Needle type: pencil-point   Needle gauge: 24 G  Needle length: 4 in  Free flowing CSF: yes    Assessment  Sensory level: T10 bilateral  Block outcome: patient comfortable  Procedure assessment: patient sedated but conversant throughout procedure and patient tolerated procedure well with no immediate complications

## 2025-04-15 NOTE — PROGRESS NOTES
Physical Therapy    Physical Therapy Evaluation & Treatment    Patient Name: Estefany Ruano  MRN: [id number]  Department:   Room: [facility]  Today's Date: 4/15/2025   Time Calculation  Start Time: 1500  Stop Time: 1553  Time Calculation (min): 53 min    Assessment/Plan   PT Assessment  PT Assessment Results: Decreased strength, Decreased mobility, Pain, Orthopedic restrictions  Rehab Prognosis: Excellent  Barriers to Discharge Home: No anticipated barriers  Evaluation/Treatment Tolerance: Patient tolerated treatment well  Medical Staff Made Aware: Yes  Strengths: Ability to acquire knowledge, Access to adaptive/assistive products, Attitude of self, Capable of completing ADLs semi/independent, Coping skills, Housing layout, Insight into problems, Premorbid level of function, Rehab experience, Support and attitude of living partners  Barriers to Participation: Comorbidities  End of Session Communication: Bedside nurse, Physician  Assessment Comment: Patient is a delightful 79 y.o. female admitted for elective R SONG, POD #0.  Patient has R SONG posteriolateral precautions and WBAT.  She demonstrated impaired movement (transfers, gait, bed mob, stairs - all currently SBA/Supv level with FWW), strength, and pain.  She will benefit from ongoing PT services with recommendation for  LOW intensity PT services at time of medical DC.  End of Session Patient Position: On cart (in PACU, RN present)   IP OR SWING BED PT PLAN  Inpatient or Swing Bed: Inpatient  PT Plan  Treatment/Interventions: Bed mobility, Transfer training, Gait training, Stair training, Strengthening, Therapeutic exercise, Therapeutic activity, Home exercise program, Positioning, Postural re-education, Balance training  PT Plan: Ongoing PT  PT Frequency: BID  PT Discharge Recommendations: Low intensity level of continued care  Equipment Recommended upon Discharge:  (FWW and cane - pt has)  PT Recommended Transfer Status: Assistive device, Stand by  assist  PT - OK to Discharge: Yes      Subjective     General Visit Information:  General  Reason for Referral: 79 y.o. female s/p Right Hip Total Arthroplasty ~ Posterior Approach  Referred By: Santi Agustin MD  Past Medical History Relevant to Rehab:   Past Medical History:   Diagnosis Date    Arthritis     Breast cancer 2016    right s/p surgery and radiation    Glaucoma     pressure stable    Hyperlipidemia     Hypertension     Hypothyroidism     Osteoporosis     Personal history of irradiation     Prediabetes     3/18/24 A1C 5.9%    Primary osteoarthritis of right hip     Plan: Right Hip Total Arthroplasty ~ Posterior Approach 3/25/25    Vitamin D deficiency       Family/Caregiver Present: Yes  Caregiver Feedback: pt's dtr present and engaged in session  Prior to Session Communication: Bedside nurse (RN reported patient with lower BPs postop.)  Patient Position Received: On cart, Bed, 2 rail up (in PACU)  Preferred Learning Style: auditory, kinesthetic, verbal, visual, written  General Comment: Pt pleasant and agreeable to mobilize.  Home Living:  Home Living  Type of Home: Condo  Lives With: Spouse  Home Adaptive Equipment: Walker rolling or standard, Reacher, Cane  Home Layout: One level  Home Access: Stairs to enter with rails  Entrance Stairs-Rails: Both  Entrance Stairs-Number of Steps: 2  Bathroom Shower/Tub: Walk-in shower  Bathroom Toilet: Handicapped height  Bathroom Equipment: Shower chair with back  Prior Level of Function:  Prior Function Per Pt/Caregiver Report  Level of [place]ic[place]: Independent with ADLs and functional transfers, Independent with homemaking with ambulation  Receives Help From:  (pt's dtr and spouse can assist if needed upon d/c; pt's spouse uses a FWW but assists with IADLs)  ADL Assistance: Independent  Homemaking Assistance: Independent  Ambulatory Assistance: Independent  Vocational: Retired  Leisure: gardening  Prior Function Comments: denies falls,  +driving  Precautions:  Precautions  LE Weight Bearing Status: Weight Bearing as Tolerated  Medical Precautions: Fall precautions  Post-Surgical Precautions: Right hip precautions (posterior)  Precautions Comment: reviewed R SONG and WBAT precautions    Vital Signs Comment: repeat /71(84) post mobility, pt asymptomatic and RN present    Objective   Pain:  Pain Assessment  Pain Assessment: 0-10  0-10 (Numeric) Pain Score: 2  Pain Type: Surgical pain  Pain Location: Hip  Pain Orientation: Right  Pain Descriptors: Aching, Sore  Pain Frequency: Constant/continuous  Pain Onset: Ongoing  Clinical Progression: Gradually worsening (3/10 with ambulation/stairs. RN aware)  Effect of Pain on Daily Activities: PT to tolerance  Patient's Stated Pain Goal: No pain  Pain Interventions: Repositioned, Cold applied, Ambulation/increased activity, Distraction, Elevated, Emotional support, Relaxation technique, Rest  Response to Interventions: Content/relaxed  Cognition:  Cognition  Overall Cognitive Status: Within Functional Limits  Orientation Level: Oriented X4  Safety/Judgement: Within Functional Limits  Insight: Within function limits  Impulsive: Within functional limits  Processing Speed: Within funtional limits    General Assessments:  General Observation  General Observation: patient completed mobility with excellent carryover of instructions and fluid movements; patient is very motivated to regain her functional I.     Activity Tolerance  Endurance: Endurance does not limit participation in activity  Early Mobility/Exercise Safety Screen: Proceed with mobilization - No exclusion criteria met  Activity Tolerance Comments: good    Sensation  Light Touch: No apparent deficits  Sharp/Dull: No apparent deficits    Strength  Strength Comments: BUE and LLE WFL; RLE grossly >3/5 as evidenced by functional mobility  Strength  Strength Comments: BUE and LLE WFL; RLE grossly >3/5 as evidenced by functional  mobility    Coordination  Movements are Fluid and Coordinated: Yes    Postural Control  Postural Control: Within Functional Limits    Static Sitting Balance  Static Sitting-Comment/Number of Minutes: supv at EOB without UE support  Dynamic Sitting Balance  Dynamic Sitting-Comments: SBA to CGA at EOB with UE support    Static Standing Balance  Static Standing-Comment/Number of Minutes: CGA to SBA with FWW support  Dynamic Standing Balance  Dynamic Standing-Comments: CGA to SBA with FWW support  Functional Assessments:  Bed Mobility  Bed Mobility: Yes  Bed Mobility 1  Bed Mobility 1: Supine to sitting, Sitting to supine  Level of Assistance 1: Minimum assistance, Moderate verbal cues, Moderate tactile cues    Transfers  Transfer: Yes  Transfer 1  Technique 1: Sit to stand, Stand to sit  Transfer Device 1: Walker, Gait belt  Transfer Level of Assistance 1: Moderate verbal cues, Contact guard  Trials/Comments 1: slightly elevated surface     Ambulation/Gait Training  Ambulation/Gait Training Performed: Yes  Ambulation/Gait Training 1  Surface 1: Level tile  Device 1: Rolling walker  Gait Support Devices: Gait belt  Assistance 1: Contact guard, Moderate verbal cues, Moderate tactile cues  Quality of Gait 1: Inconsistent stride length, Diminished heel strike, Decreased step length, Forward flexed posture, Antalgic, Narrow base of support  Comments/Distance (ft) 1: 8 feet with step to pattern initially and instruciton for walker placement/sequencing.     Extremity/Trunk Assessments:  Cervical Spine   Cervical Spine: Within Functional Limits  Lumbar Spine   Lumbar Spine : Within Functional Limits    RUE   RUE : Within Functional Limits  LUE   LUE: Within Functional Limits  RLE   RLE :  (grossly WFL with adherence to precautions for ROM; strength >3/5)  LLE   LLE : Within Functional Limits  Treatments:  Therapeutic Exercise  Therapeutic Exercise Performed: Yes  Therapeutic Exercise Activity 1: AP, QS, GS, HS (min A to SBA),  SAQ, ABD (min A to CGA), LAQ x15 reps with cues for alignment, sequencing and paired breathing    Therapeutic Activity  Therapeutic Activity Performed: Yes  Therapeutic Activity 1: static standing with gait belt and LUE support on grabbar in bathroom x2 minutes with increased LLE WBing; pt then ambulated to sink with FWW and CGA; no UE support at sink with good balance x1 minute  Therapeutic Activity 2: instruction and education provided for mobility and HEP with respect to SONG postop precautions  Therapeutic Activity 3: demonstration for car transfer completed from PT with verbal instruction concurrent with movement; pt acknowledged understanding of technique recommended    Bed Mobility  Bed Mobility: Yes  Bed Mobility 1  Bed Mobility 1: Supine to sitting, Sitting to supine  Level of Assistance 1: Minimum assistance, Moderate verbal cues, Moderate tactile cues  Bed Mobility Comments 1: instruction for sequencing, RLE positioning and support from PT for proper alignment with respect to R SONG precautions    Ambulation/Gait Training  Ambulation/Gait Training Performed: Yes  Ambulation/Gait Training 1  Surface 1: Level tile  Device 1: Rolling walker  Gait Support Devices: Gait belt  Assistance 1: Contact guard, Moderate verbal cues, Moderate tactile cues  Quality of Gait 1: Inconsistent stride length, Diminished heel strike, Decreased step length, Forward flexed posture, Antalgic, Narrow base of support  Comments/Distance (ft) 1: 8 feet with step to pattern initially and instruciton for walker placement/sequencing. (patient required cues for upright posture, bringing walker closer to her COG and improving mild lateral trunk deviation)  Ambulation/Gait Training 2  Surface 2: Level tile  Device 2: Rolling walker  Gait Support Devices: Gait belt  Assistance 2: Set up, Moderate verbal cues, Moderate tactile cues, Contact guard  Quality of Gait 2: Diminished heel strike, Inconsistent stride length, Decreased step length,  Antalgic, Forward flexed posture  Comments/Distance (ft) 2: 70 feet with step to pattern and increased BUE tension/support. cues for progressive mobility; more fluid with increased distance  Ambulation/Gait Training 3  Surface 3: Level tile  Device 3: Rolling walker  Gait Support Devices: Gait belt  Assistance 3: Contact guard, Close supervision, Minimal verbal cues, Minimal tactile cues  Quality of Gait 3: Decreased step length, Inconsistent stride length, Antalgic  Comments/Distance (ft) 3: 100 feet and 80 feet with forward/retro stepping and cues for sequencing and walker placement, fluctuating pattern with reciprocal and step to pattern; patient with increased instability with reciprocal pattern, reverted to step to pattern.  Transfers  Transfer: Yes  Transfer 1  Technique 1: Sit to stand, Stand to sit  Transfer Device 1: Walker, Gait belt  Transfer Level of Assistance 1: Moderate verbal cues, Contact guard  Trials/Comments 1: slightly elevated surface (compelted 5 trials from various surfaces/heights with progression to SBA/supv and min VC for RLE advancement, transitional hand placement, walker positioning and safety)  Transfers 2  Transfer to 2: Car  Technique 2: Sit to stand, Stand to sit, Sit pivot  Transfer Device 2: Walker, Gait belt  Transfer Level of Assistance 2: Set up, Contact guard, Close supervision, Moderate verbal cues, Moderate tactile cues  Trials/Comments 2: simulated car transfer with multimodal cues and setup. review for retro scooting onto surface prior to performing sit pivot into simulated car; followed by reversal and cues for sit pivot out of car with forward scooting prior to standing; cues for adherence to precautions. dtr present for ongoing support for pt.    Stairs  Stairs: Yes  Stairs  Rails 1: Bilateral  Device 1: Railing  Support Devices 1: Gait belt  Assistance 1: Contact guard, Close supervision, Moderate verbal cues  Comment/Number of Steps 1: following PT verbal instruction,  patient ascended/descended 4 stairs with step to pattern, leading with LLE to ascend and RLE to descend; review for hand placement, caregiver guarding and transition from railing to FWW support  Outcome Measures:  [facility] Basic Mobility  Turning from your back to your side while in a flat bed without using bedrails: A little  Moving from lying on your back to sitting on the side of a flat bed without using bedrails: A little  Moving to and from bed to chair (including a wheelchair): A little  Standing up from a chair using your arms (e.g. wheelchair or bedside chair): A little  To walk in hospital room: A little  Climbing 3-5 steps with railing: A little  Basic Mobility - Total Score: 18    Encounter Problems       Encounter Problems (Active)       PT Problem       Complete bed mobility with HOB flat, Mod I with adherence to precautions  (Progressing)       Start:  04/15/25    Expected End:  04/17/25            Complete all transfers with LRAD and mod I, from all surfaces/heights and with adherence to SONG precautions. (Progressing)       Start:  04/15/25    Expected End:  04/17/25            Pt will amb >250 feet with LRAD and mod I, using reciprocal steady pattern, and adhering to SONG precautions.  (Progressing)       Start:  04/15/25    Expected End:  04/17/25            Patient will ascend/descend >2 stairs with HR support +/- LRAD and SBA, with adherence to precautions and safe technique  (Progressing)       Start:  04/15/25    Expected End:  04/17/25            HEP & Precautions (Progressing)       Start:  04/15/25    Expected End:  04/17/25       1) Patient will demonstrate independence with SONG HEP x20 reps with proper sequencing.  2) Patient will demonstrate >4/5 strength and independent RLE activation with activity and therex.   3) Patient will independently recall and adhere to RLE posteriolateral hip precautions and WBAT precautions with all mobility                 Education Documentation  Handouts, taught  by Ana Peñaloza PT at 4/15/2025  6:09 PM.  Learner: Family, Patient  Readiness: Acceptance  Method: Explanation, Demonstration, Handout  Response: Verbalizes Understanding, Demonstrated Understanding  Comment: patient instructed on PT's role, safety with mobility, precautions, HEP, bed mob, transfers, gait training, use of ice, postural corrections, seuqencing and use of FWW. very engaged in session with good carryover.    Precautions, taught by Ana Peñaloza PT at 4/15/2025  6:09 PM.  Learner: Family, Patient  Readiness: Acceptance  Method: Explanation, Demonstration, Handout  Response: Verbalizes Understanding, Demonstrated Understanding  Comment: patient instructed on PT's role, safety with mobility, precautions, HEP, bed mob, transfers, gait training, use of ice, postural corrections, seuqencing and use of FWW. very engaged in session with good carryover.    Body Mechanics, taught by Ana Peñaloza PT at 4/15/2025  6:09 PM.  Learner: Family, Patient  Readiness: Acceptance  Method: Explanation, Demonstration, Handout  Response: Verbalizes Understanding, Demonstrated Understanding  Comment: patient instructed on PT's role, safety with mobility, precautions, HEP, bed mob, transfers, gait training, use of ice, postural corrections, seuqencing and use of FWW. very engaged in session with good carryover.    Home Exercise Program, taught by Ana Peñaloza PT at 4/15/2025  6:09 PM.  Learner: Family, Patient  Readiness: Acceptance  Method: Explanation, Demonstration, Handout  Response: Verbalizes Understanding, Demonstrated Understanding  Comment: patient instructed on PT's role, safety with mobility, precautions, HEP, bed mob, transfers, gait training, use of ice, postural corrections, seuqencing and use of FWW. very engaged in session with good carryover.    Mobility Training, taught by Ana Peñaloza PT at 4/15/2025  6:09 PM.  Learner: Family, Patient  Readiness: Acceptance  Method: Explanation,  Demonstration, Handout  Response: Verbalizes Understanding, Demonstrated Understanding  Comment: patient instructed on PT's role, safety with mobility, precautions, HEP, bed mob, transfers, gait training, use of ice, postural corrections, seuqencing and use of FWW. very engaged in session with good carryover.    Education Comments  No comments found.

## 2025-04-15 NOTE — NURSING NOTE
Met with Patient and Care Partner at bedside- Patient is s/p Right Posterior Hip Replacement with Dr. Coleman. Discussion with patient included education on the following topics: [facility] Education: Wound Care (Bandage Care & Removal, Personal Hygiene & Infection Prevention), Post-Op Activity (Home PT Regimen, Movement Precautions, Assistive Equipment & 1-2hr Movement), Post-Op Precautions (Falls, Blood Clots & Constipation), Cold-Therapy (Ice vs. Cold Therapy Machines) , Methods for Symptom Management (Pain, Nausea, Swelling & Constipation), Importance of Post-op Prescriptions, How to Obtain Medication Refills, When to Resume Driving & Who to Call, Use of [employer] & Staff Contact Information, When to call 9-1-1, and When to call the Surgeon's Office. Patient attended joint class prior to surgery. Patient is able to verbalize understanding of class content/discussion. Contact information was provided to patient for support and assistance during the post-operative period. She was given My Medication Education tool as a reference for her discharge medications.

## 2025-04-16 ENCOUNTER — HOME CARE VISIT (OUTPATIENT)
Dept: HOME HEALTH SERVICES | Facility: HOME HEALTH | Age: 80
End: 2025-04-16
Payer: MEDICARE

## 2025-04-16 VITALS
RESPIRATION RATE: 16 BRPM | SYSTOLIC BLOOD PRESSURE: 90 MMHG | DIASTOLIC BLOOD PRESSURE: 60 MMHG | HEART RATE: 75 BPM | OXYGEN SATURATION: 97 %

## 2025-04-16 PROCEDURE — 169592 NO-PAY CLAIM PROCEDURE

## 2025-04-16 PROCEDURE — G0151 HHCP-SERV OF PT,EA 15 MIN: HCPCS | Mod: HHH

## 2025-04-16 SDOH — ECONOMIC STABILITY: HOUSING INSECURITY
HOME SAFETY: UH BOOKLET REVIEWED. CONSENT SIGNED. EEP REVIEWED.PT SHOWN CODE TO SCAN FOR HOME CARE NATIONAL PATIENT SAFETY GOALS ON FRONT OF UHHC FOLDER.

## 2025-04-16 SDOH — HEALTH STABILITY: PHYSICAL HEALTH: EXERCISE COMMENTS: INITIATED THA PROTOCOL EX  REVIEWED HIP PRECAUTIONS.

## 2025-04-16 ASSESSMENT — ENCOUNTER SYMPTOMS
PAIN: 1
PAIN LOCATION - PAIN SEVERITY: 2/10
SUBJECTIVE PAIN PROGRESSION: WAXING AND WANING
LOWEST PAIN SEVERITY IN PAST 24 HOURS: 2/10
PAIN LOCATION: RIGHT HIP
MUSCLE WEAKNESS: 1
OCCASIONAL FEELINGS OF UNSTEADINESS: 0
PAIN SEVERITY GOAL: 2/10
HIGHEST PAIN SEVERITY IN PAST 24 HOURS: 7/10
PERSON REPORTING PAIN: PATIENT

## 2025-04-16 ASSESSMENT — ACTIVITIES OF DAILY LIVING (ADL)
AMBULATION ASSISTANCE: STAND BY ASSIST
PHYSICAL TRANSFERS ASSESSED: 1
AMBULATION ASSISTANCE ON FLAT SURFACES: 1
AMBULATION ASSISTANCE: 1
AMBULATION_DISTANCE/DURATION_TOLERATED: 50FT
CURRENT_FUNCTION: STAND BY ASSIST
ENTERING_EXITING_HOME: SUPERVISION
OASIS_M1830: 05

## 2025-04-17 ENCOUNTER — TELEPHONE (OUTPATIENT)
Dept: ORTHOPEDIC SURGERY | Facility: HOSPITAL | Age: 80
End: 2025-04-17
Payer: MEDICARE

## 2025-04-17 NOTE — TELEPHONE ENCOUNTER
Spoke with patient during follow-up phone call.  Patient indicates that they are doing well and pain is under control.  Patient denies questions related to discharge instructions or homegoing medications.  Patient is aware of follow up visit with surgeon's office.  Home Care has established a first visit with patient.  She has not had a bowel movement since surgery. She has been taking colace and dulcolax. Patient was encouraged to increase her fiber and water in her diet. She was encouraged to use a suppository if no results and to call back for additional recommendations.  Patient denies addtional questions or concerns at this time and verbalizes understanding to call RN Navigator or Surgeon's office with any new or worsening symptoms.

## 2025-04-22 ENCOUNTER — HOME CARE VISIT (OUTPATIENT)
Dept: HOME HEALTH SERVICES | Facility: HOME HEALTH | Age: 80
End: 2025-04-22
Payer: MEDICARE

## 2025-04-22 VITALS — TEMPERATURE: 98.2 F | HEART RATE: 84 BPM | RESPIRATION RATE: 16 BRPM

## 2025-04-22 PROCEDURE — G0151 HHCP-SERV OF PT,EA 15 MIN: HCPCS | Mod: HHH

## 2025-04-22 SDOH — HEALTH STABILITY: PHYSICAL HEALTH: EXERCISE TYPE: SEE THER EX COMMENTS BELOW

## 2025-04-22 SDOH — HEALTH STABILITY: PHYSICAL HEALTH
EXERCISE COMMENTS: SUPINE QAUD AND GLUT SETS, ANKLE PUMPS, HEEL SLIDES X 20  STANDING BILATERAL HEEL RAISES, RIGHT MODIFIED HIP ABD AND HIP EXT, HAMSTRING CURLS, RIGHT HIP FLEXION X 10 REPS  SITTING RIGHT LAQ, ANKLE PUMPS X 15 REPS

## 2025-04-22 ASSESSMENT — ENCOUNTER SYMPTOMS
PAIN LOCATION - PAIN QUALITY: SORE, ACHEY
MUSCLE WEAKNESS: 1
PAIN LOCATION - PAIN FREQUENCY: INTERMITTENT
PAIN LOCATION: RIGHT HIP
PAIN LOCATION - RELIEVING FACTORS: REST, MEDS, ICE
PAIN SEVERITY GOAL: 0/10
HIGHEST PAIN SEVERITY IN PAST 24 HOURS: 5/10
SUBJECTIVE PAIN PROGRESSION: GRADUALLY IMPROVING
PERSON REPORTING PAIN: PATIENT
PAIN LOCATION - PAIN SEVERITY: 3/10
PAIN: 1
PAIN LOCATION - PAIN DURATION: VARIES
LOWEST PAIN SEVERITY IN PAST 24 HOURS: 0/10

## 2025-04-22 ASSESSMENT — ACTIVITIES OF DAILY LIVING (ADL)
AMBULATION ASSISTANCE: INDEPENDENT
PHYSICAL TRANSFERS ASSESSED: 1
CURRENT_FUNCTION: INDEPENDENT
AMBULATION ASSISTANCE ON FLAT SURFACES: 1
AMBULATION_DISTANCE/DURATION_TOLERATED: 100
AMBULATION ASSISTANCE: 1

## 2025-04-24 DIAGNOSIS — E78.2 MIXED HYPERLIPIDEMIA: ICD-10-CM

## 2025-04-24 RX ORDER — ATORVASTATIN CALCIUM 10 MG/1
10 TABLET, FILM COATED ORAL DAILY
Qty: 90 TABLET | Refills: 1 | Status: SHIPPED | OUTPATIENT
Start: 2025-04-24

## 2025-04-28 ENCOUNTER — OFFICE VISIT (OUTPATIENT)
Dept: ORTHOPEDIC SURGERY | Facility: CLINIC | Age: 80
End: 2025-04-28
Payer: MEDICARE

## 2025-04-28 ENCOUNTER — HOSPITAL ENCOUNTER (OUTPATIENT)
Dept: RADIOLOGY | Facility: CLINIC | Age: 80
Discharge: HOME | End: 2025-04-28
Payer: MEDICARE

## 2025-04-28 DIAGNOSIS — Z96.641 STATUS POST HIP REPLACEMENT, RIGHT: ICD-10-CM

## 2025-04-28 PROCEDURE — 1036F TOBACCO NON-USER: CPT | Performed by: ORTHOPAEDIC SURGERY

## 2025-04-28 PROCEDURE — 73502 X-RAY EXAM HIP UNI 2-3 VIEWS: CPT | Mod: RT

## 2025-04-28 PROCEDURE — 73502 X-RAY EXAM HIP UNI 2-3 VIEWS: CPT | Mod: RIGHT SIDE | Performed by: RADIOLOGY

## 2025-04-28 PROCEDURE — 1159F MED LIST DOCD IN RCRD: CPT | Performed by: ORTHOPAEDIC SURGERY

## 2025-04-28 PROCEDURE — 99211 OFF/OP EST MAY X REQ PHY/QHP: CPT | Performed by: ORTHOPAEDIC SURGERY

## 2025-04-28 NOTE — PROGRESS NOTES
S: Pain well controlled.  Occasionally without walk around the house.  Working with home therapy.  Right leg feels a little longer.  Having no groin pain.    O: Incisions healing nicely. Good alignment.  No pain with hip logroll.  Good hip flexion strength.    XR: I personally reviewed the following radiographic exams: AP pelvis and right hip shows well-fixed well aligned uncemented total hip.  Slightly longer by 4 mm on AP view.    A: S/P right total hip.    P: Reviewed x-ray findings.  I think her perception of leg length inequality will likely decrease with time.  Discussed using home therapy to learn the exercises and doing things on her own.  Can wean to a cane when she feels comfortable.  Follow-up in 2 months.

## 2025-04-30 ENCOUNTER — HOME CARE VISIT (OUTPATIENT)
Dept: HOME HEALTH SERVICES | Facility: HOME HEALTH | Age: 80
End: 2025-04-30
Payer: MEDICARE

## 2025-04-30 VITALS
HEART RATE: 74 BPM | RESPIRATION RATE: 16 BRPM | DIASTOLIC BLOOD PRESSURE: 60 MMHG | TEMPERATURE: 98.5 F | SYSTOLIC BLOOD PRESSURE: 108 MMHG

## 2025-04-30 PROCEDURE — G0151 HHCP-SERV OF PT,EA 15 MIN: HCPCS | Mod: HHH

## 2025-04-30 SDOH — HEALTH STABILITY: PHYSICAL HEALTH
EXERCISE COMMENTS: STANDING HEEL RAISES, MINI SQUATS, HIP ABD, HIP EXT, HAMSTRING CURLS, MARCHING X 20 REPS  SITTING RIGHT LAQ X 20  SUPINE QUAD AND GLUT SETS, ANKLE PUMPS X 30, RIGHT HEEL SLIDES X 20 REPS

## 2025-04-30 SDOH — HEALTH STABILITY: PHYSICAL HEALTH: EXERCISE TYPE: SEE THER EX COMMENTS

## 2025-04-30 ASSESSMENT — ACTIVITIES OF DAILY LIVING (ADL)
PHYSICAL TRANSFERS ASSESSED: 1
AMBULATION ASSISTANCE ON FLAT SURFACES: 1
AMBULATION_DISTANCE/DURATION_TOLERATED: 300 FEET
AMBULATION ASSISTANCE: 1
HOME_HEALTH_OASIS: 00
AMBULATION ASSISTANCE: INDEPENDENT
CURRENT_FUNCTION: INDEPENDENT
OASIS_M1830: 01

## 2025-04-30 ASSESSMENT — ENCOUNTER SYMPTOMS
PAIN LOCATION - PAIN DURATION: VARIES
PAIN LOCATION: RIGHT HIP
PAIN: 1
PAIN LOCATION - RELIEVING FACTORS: REST, ICE, MEDS
MUSCLE WEAKNESS: 1
LOWEST PAIN SEVERITY IN PAST 24 HOURS: 0/10
PAIN LOCATION - PAIN QUALITY: SORE
HIGHEST PAIN SEVERITY IN PAST 24 HOURS: 2/10
PAIN LOCATION - PAIN FREQUENCY: INTERMITTENT
PERSON REPORTING PAIN: PATIENT
PAIN LOCATION - PAIN SEVERITY: 2/10
PAIN SEVERITY GOAL: 0/10

## 2025-05-08 DIAGNOSIS — I10 HYPERTENSION, UNSPECIFIED TYPE: ICD-10-CM

## 2025-05-08 RX ORDER — TRIAMTERENE AND HYDROCHLOROTHIAZIDE 37.5; 25 MG/1; MG/1
1 CAPSULE ORAL DAILY
Qty: 90 CAPSULE | Refills: 3 | Status: SHIPPED | OUTPATIENT
Start: 2025-05-08

## 2025-06-04 ENCOUNTER — OFFICE VISIT (OUTPATIENT)
Dept: ORTHOPEDIC SURGERY | Facility: CLINIC | Age: 80
End: 2025-06-04
Payer: MEDICARE

## 2025-06-04 ENCOUNTER — HOSPITAL ENCOUNTER (OUTPATIENT)
Dept: RADIOLOGY | Facility: CLINIC | Age: 80
Discharge: HOME | End: 2025-06-04
Payer: MEDICARE

## 2025-06-04 DIAGNOSIS — M25.562 LEFT KNEE PAIN, UNSPECIFIED CHRONICITY: ICD-10-CM

## 2025-06-04 DIAGNOSIS — M17.10 ARTHRITIS OF KNEE: Primary | ICD-10-CM

## 2025-06-04 PROCEDURE — 1036F TOBACCO NON-USER: CPT | Performed by: ORTHOPAEDIC SURGERY

## 2025-06-04 PROCEDURE — 20610 DRAIN/INJ JOINT/BURSA W/O US: CPT | Performed by: ORTHOPAEDIC SURGERY

## 2025-06-04 PROCEDURE — 1159F MED LIST DOCD IN RCRD: CPT | Performed by: ORTHOPAEDIC SURGERY

## 2025-06-04 PROCEDURE — 73562 X-RAY EXAM OF KNEE 3: CPT | Mod: LEFT SIDE | Performed by: RADIOLOGY

## 2025-06-04 PROCEDURE — 99214 OFFICE O/P EST MOD 30 MIN: CPT | Performed by: ORTHOPAEDIC SURGERY

## 2025-06-04 PROCEDURE — 73562 X-RAY EXAM OF KNEE 3: CPT | Mod: LT

## 2025-06-04 RX ORDER — METHYLPREDNISOLONE ACETATE 40 MG/ML
40 INJECTION, SUSPENSION INTRA-ARTICULAR; INTRALESIONAL; INTRAMUSCULAR; SOFT TISSUE
Status: COMPLETED | OUTPATIENT
Start: 2025-06-04 | End: 2025-06-04

## 2025-06-04 RX ORDER — LIDOCAINE HYDROCHLORIDE 20 MG/ML
2 INJECTION, SOLUTION INFILTRATION; PERINEURAL
Status: COMPLETED | OUTPATIENT
Start: 2025-06-04 | End: 2025-06-04

## 2025-06-04 RX ADMIN — LIDOCAINE HYDROCHLORIDE 2 ML: 20 INJECTION, SOLUTION INFILTRATION; PERINEURAL at 11:31

## 2025-06-04 RX ADMIN — METHYLPREDNISOLONE ACETATE 40 MG: 40 INJECTION, SUSPENSION INTRA-ARTICULAR; INTRALESIONAL; INTRAMUSCULAR; SOFT TISSUE at 11:31

## 2025-06-04 NOTE — PROGRESS NOTES
"Returns for new problem.  Developed L knee pain since last week. Twisted on toilet and had sharp pain in her left knee.  Went back on the walker because of it.  Feels like her right hip is a little bit longer than her left and wonders whether that contributed.  She is considering getting \"orthotics\".  No previous knee problems.    WD/WN thin female  A+O X3  No lymphedema  Inspection of both knees shows symmetric alignment.   No flexion contracture.   Mild left crepitus through range of motion.   5/5 strength in quads and hamstrings.   Stable varus/valgus stress.   (-) Lachman.   (-) Jessie.   Sensation intact to LT.   Good pulses in both legs.   Small left effusion.   No erythema.    I personally reviewed the following radiographic exams: X-ray left knee shows mild tricompartmental arthritis.  No acute change.    Assessment: Mild left knee arthritis with acute effusion/inflammation.    Plan: Discussed nonoperative and operative options in detail.   Risk and benefits discussed in detail. All questions answered today.  Recovery timeline and expectations discussed in detail.  Offered cortisone injection.  I think this will likely resolve the problem.  Discussed obtaining a heel lift to modify her left shoe to see if that helps with her perception of leg length inequality though by x-ray it is only a few millimeters longer.  Has a follow-up for her hip in a few weeks and we will check in on that.  After informed consent under sterile conditions the left knee was injected with a combination of  2cc 2% lidocaine and 40mg Methylprednisolone. Risks and benefits were discussed in detail.  Patient ID: Bell Ruano \"Estefany\" is a 80 y.o. female.    L Inj/Asp: L knee on 6/4/2025 11:31 AM  Indications: pain, joint swelling and diagnostic evaluation  Details: 21 G needle, anterolateral approach  Medications: 40 mg methylPREDNISolone acetate 40 mg/mL; 2 mL lidocaine 20 mg/mL (2 %)  Outcome: tolerated well, no immediate " complications  Procedure, treatment alternatives, risks and benefits explained, specific risks discussed. Consent was given by the patient. Immediately prior to procedure a time out was called to verify the correct patient, procedure, equipment, support staff and site/side marked as required. Patient was prepped and draped in the usual sterile fashion.

## 2025-06-27 ENCOUNTER — OFFICE VISIT (OUTPATIENT)
Dept: ORTHOPEDIC SURGERY | Facility: CLINIC | Age: 80
End: 2025-06-27
Payer: MEDICARE

## 2025-06-27 DIAGNOSIS — M25.562 LEFT KNEE PAIN, UNSPECIFIED CHRONICITY: Primary | ICD-10-CM

## 2025-06-27 DIAGNOSIS — M17.10 ARTHRITIS OF KNEE: ICD-10-CM

## 2025-06-27 PROCEDURE — 20610 DRAIN/INJ JOINT/BURSA W/O US: CPT | Mod: LT | Performed by: ORTHOPAEDIC SURGERY

## 2025-06-27 PROCEDURE — 99213 OFFICE O/P EST LOW 20 MIN: CPT | Performed by: ORTHOPAEDIC SURGERY

## 2025-06-27 PROCEDURE — 1036F TOBACCO NON-USER: CPT | Performed by: ORTHOPAEDIC SURGERY

## 2025-06-27 PROCEDURE — 2500000004 HC RX 250 GENERAL PHARMACY W/ HCPCS (ALT 636 FOR OP/ED): Performed by: ORTHOPAEDIC SURGERY

## 2025-06-27 PROCEDURE — 1159F MED LIST DOCD IN RCRD: CPT | Performed by: ORTHOPAEDIC SURGERY

## 2025-06-27 RX ADMIN — Medication 20 MG: at 13:05

## 2025-06-27 NOTE — PROGRESS NOTES
"Minimal relief with cortisone injection last time.  Still having tenderness along the medial aspect the knee.  No mechanical symptoms.    Exam: Point tender medial joint line.  No obvious effusion.    Assessment: Left knee medial joint line pain, probable arthritis.  Possible meniscus but no mechanical symptoms.    Plan: Discussed nonoperative and operative options in detail.   Risk and benefits discussed in detail. All questions answered today.  Recovery timeline and expectations discussed in detail.  We offered Hyalgan injections.  Not really having mechanical symptoms suggesting a meniscal tear.  Can always consider an MRI in the future Hyalgan does not work.  She is comfortable with this plan.  After informed consent under sterile conditions the left knee was injected with Hyalgan. Risks and benefits were discussed in detail.  Patient ID: Bell Ruano \"Chanel" is a 80 y.o. female.    L Inj/Asp: L knee on 6/27/2025 1:05 PM  Indications: pain, joint swelling and diagnostic evaluation  Details: 21 G needle, anterolateral approach  Medications: 20 mg sodium hyaluronate 10 mg/mL  Outcome: tolerated well, no immediate complications  Procedure, treatment alternatives, risks and benefits explained, specific risks discussed. Consent was given by the patient. Immediately prior to procedure a time out was called to verify the correct patient, procedure, equipment, support staff and site/side marked as required. Patient was prepped and draped in the usual sterile fashion.         "

## 2025-07-11 ENCOUNTER — OFFICE VISIT (OUTPATIENT)
Dept: ORTHOPEDIC SURGERY | Facility: CLINIC | Age: 80
End: 2025-07-11
Payer: MEDICARE

## 2025-07-11 DIAGNOSIS — M17.10 ARTHRITIS OF KNEE: Primary | ICD-10-CM

## 2025-07-11 PROCEDURE — 20610 DRAIN/INJ JOINT/BURSA W/O US: CPT | Mod: LT | Performed by: ORTHOPAEDIC SURGERY

## 2025-07-11 PROCEDURE — 2500000004 HC RX 250 GENERAL PHARMACY W/ HCPCS (ALT 636 FOR OP/ED): Performed by: ORTHOPAEDIC SURGERY

## 2025-07-11 RX ADMIN — Medication 20 MG: at 11:17

## 2025-07-11 NOTE — PROGRESS NOTES
"Minimal relief with first injection.    Exam: Point tender medial joint line.  No obvious effusion.    Assessment: Left knee medial joint line pain, probable arthritis.  Possible meniscus but no mechanical symptoms.    Plan: Discussed nonoperative and operative options in detail.   Risk and benefits discussed in detail. All questions answered today.  Recovery timeline and expectations discussed in detail.  After informed consent under sterile conditions the left knee was injected with Hyalgan. Risks and benefits were discussed in detail.  Patient ID: Bell Ruano \"Chanel" is a 80 y.o. female.    L Inj/Asp: L knee on 7/11/2025 11:17 AM  Indications: pain, joint swelling and diagnostic evaluation  Details: 21 G needle, anterolateral approach  Medications: 20 mg sodium hyaluronate 10 mg/mL  Outcome: tolerated well, no immediate complications  Procedure, treatment alternatives, risks and benefits explained, specific risks discussed. Consent was given by the patient. Immediately prior to procedure a time out was called to verify the correct patient, procedure, equipment, support staff and site/side marked as required. Patient was prepped and draped in the usual sterile fashion.         "

## 2025-07-18 ENCOUNTER — OFFICE VISIT (OUTPATIENT)
Dept: ORTHOPEDIC SURGERY | Facility: CLINIC | Age: 80
End: 2025-07-18
Payer: MEDICARE

## 2025-07-18 DIAGNOSIS — M25.562 LEFT KNEE PAIN, UNSPECIFIED CHRONICITY: ICD-10-CM

## 2025-07-18 DIAGNOSIS — M17.10 ARTHRITIS OF KNEE: Primary | ICD-10-CM

## 2025-07-18 PROCEDURE — 2500000004 HC RX 250 GENERAL PHARMACY W/ HCPCS (ALT 636 FOR OP/ED): Performed by: ORTHOPAEDIC SURGERY

## 2025-07-18 PROCEDURE — 20610 DRAIN/INJ JOINT/BURSA W/O US: CPT | Mod: LT | Performed by: ORTHOPAEDIC SURGERY

## 2025-07-18 RX ADMIN — Medication 20 MG: at 11:15

## 2025-07-18 NOTE — PROGRESS NOTES
"Minimal relief with second injection.    Exam: Point tender medial joint line.  No obvious effusion.    Assessment: Left knee medial joint line pain, probable arthritis.  Possible meniscus but no mechanical symptoms.    Plan: Discussed nonoperative and operative options in detail.   Risk and benefits discussed in detail. All questions answered today.  Recovery timeline and expectations discussed in detail.  Discussed obtaining MRI if she does not get significant relief after the third injection.  Can evaluate for meniscal tear and degree of arthritis and consider surgical options if necessary.  After informed consent under sterile conditions the left knee was injected with Hyalgan. Risks and benefits were discussed in detail.  Patient ID: Bell Ruano \"Estefany\" is a 80 y.o. female.    L Inj/Asp: L knee on 7/18/2025 11:15 AM  Indications: pain, joint swelling and diagnostic evaluation  Details: 21 G needle, anterolateral approach  Medications: 20 mg sodium hyaluronate 10 mg/mL  Outcome: tolerated well, no immediate complications  Procedure, treatment alternatives, risks and benefits explained, specific risks discussed. Consent was given by the patient. Immediately prior to procedure a time out was called to verify the correct patient, procedure, equipment, support staff and site/side marked as required. Patient was prepped and draped in the usual sterile fashion.         "

## (undated) DEVICE — CLOSURE SYSTEM, DERMABOND, PRINEO, 22CM, STERILE

## (undated) DEVICE — PINNACLE CANCELLOUS BONE SCREW 6.5MM X 30MM
Type: IMPLANTABLE DEVICE | Site: HIP | Status: NON-FUNCTIONAL
Brand: PINNACLE

## (undated) DEVICE — DRAPE, INCISE, ANTIMICROBIAL, IOBAN 2, LARGE, 17 X 23 IN, DISPOSABLE, STERILE

## (undated) DEVICE — DRESSING, MEPILEX BORDER, POST-OP AG, 4 X 10 IN

## (undated) DEVICE — DRAPE, ISOLATION, ANTIMICROBIAL, W/POUCH, IOBAN 2, STERI DRAPE, 125 X 83 IN, DISPOSABLE, STERILE

## (undated) DEVICE — SUTURE, VICRYL PLUS, 2-0, 27 IN, CT-2, UNDYED

## (undated) DEVICE — HIGH FLOW TIP FOR INTERPULSE HANDPIECE SET

## (undated) DEVICE — SUTURE, MONOCRYL, 4-0, 18 IN, PS2, UNDYED

## (undated) DEVICE — SUTURE, VICRYL, 0, 36 IN, CT-1, UNDYED

## (undated) DEVICE — INTERPULSE HANDPIECE SET W/ 10FT SUCTION TUBING

## (undated) DEVICE — PILLOW, ABDUCTION, MEDIUM

## (undated) DEVICE — SYRINGE, 60 CC, IRRIGATION, BULB, CONTRO-BULB, PAPER POUCH

## (undated) DEVICE — DRAPE, PAD, PREP, W/ 9 IN CUFF, 24 X 41, LF, NS

## (undated) DEVICE — GLOVE, SURGICAL, PROTEXIS PI ORTHO, 8.0, PF, LF

## (undated) DEVICE — Device

## (undated) DEVICE — BLADE, SAW PFC DUAL CUT 25 X 90

## (undated) DEVICE — SUTURE, VICRYL, 1, 24 IN, CTD, UNDYED

## (undated) DEVICE — TOWEL, SURGICAL, NEURO, O/R, 16 X 26, BLUE, STERILE

## (undated) DEVICE — GLOVE, SURGICAL, PROTEXIS PI W/NEU-THERA, 8.0, PF, LF